# Patient Record
Sex: MALE | URBAN - METROPOLITAN AREA
[De-identification: names, ages, dates, MRNs, and addresses within clinical notes are randomized per-mention and may not be internally consistent; named-entity substitution may affect disease eponyms.]

---

## 2020-01-31 ENCOUNTER — APPOINTMENT (OUTPATIENT)
Age: 65
Setting detail: DERMATOLOGY
End: 2020-01-31

## 2020-01-31 VITALS — HEIGHT: 73 IN | RESPIRATION RATE: 16 BRPM | WEIGHT: 260 LBS

## 2020-01-31 DIAGNOSIS — L738 OTHER SPECIFIED DISEASES OF HAIR AND HAIR FOLLICLES: ICD-10-CM

## 2020-01-31 DIAGNOSIS — L663 OTHER SPECIFIED DISEASES OF HAIR AND HAIR FOLLICLES: ICD-10-CM

## 2020-01-31 PROBLEM — L02.02 FURUNCLE OF FACE: Status: ACTIVE | Noted: 2020-01-31

## 2020-01-31 PROBLEM — L02.92 FURUNCLE, UNSPECIFIED: Status: ACTIVE | Noted: 2020-01-31

## 2020-01-31 PROCEDURE — OTHER PRESCRIPTION: OTHER

## 2020-01-31 PROCEDURE — OTHER COUNSELING: OTHER

## 2020-01-31 PROCEDURE — 99202 OFFICE O/P NEW SF 15 MIN: CPT

## 2020-01-31 RX ORDER — CLINDAMYCIN PHOSPHATE 10 MG/ML
1% SOLUTION TOPICAL BID
Qty: 1 | Refills: 6 | Status: ERX

## 2020-01-31 ASSESSMENT — LOCATION ZONE DERM
LOCATION ZONE: FACE
LOCATION ZONE: SCALP

## 2020-01-31 ASSESSMENT — LOCATION DETAILED DESCRIPTION DERM
LOCATION DETAILED: HAIR
LOCATION DETAILED: LEFT SUPERIOR LATERAL BUCCAL CHEEK

## 2020-01-31 ASSESSMENT — LOCATION SIMPLE DESCRIPTION DERM
LOCATION SIMPLE: HAIR
LOCATION SIMPLE: LEFT CHEEK

## 2020-01-31 NOTE — HPI: RASH
How Severe Is Your Rash?: moderate
Is This A New Presentation, Or A Follow-Up?: Rash
Additional History: Has tried mayonaise. Used Aveda all natural shampoo.   Has been getting scanning and oozing. Has not had any medications for several years.

## 2020-02-05 ENCOUNTER — RX ONLY (RX ONLY)
Age: 65
End: 2020-02-05

## 2020-02-05 RX ORDER — BETAMETHASONE DIPROPIONATE 0.5 MG/ML
0.05% LOTION TOPICAL BID
Qty: 1 | Refills: 2 | Status: ERX | COMMUNITY
Start: 2020-02-05

## 2020-02-27 ENCOUNTER — APPOINTMENT (OUTPATIENT)
Age: 65
Setting detail: DERMATOLOGY
End: 2020-02-29

## 2020-02-27 ENCOUNTER — TRANSFERRED RECORDS (OUTPATIENT)
Dept: HEALTH INFORMATION MANAGEMENT | Facility: CLINIC | Age: 65
End: 2020-02-27

## 2020-02-27 VITALS — RESPIRATION RATE: 15 BRPM | HEIGHT: 73 IN | WEIGHT: 260 LBS

## 2020-02-27 DIAGNOSIS — L29.8 OTHER PRURITUS: ICD-10-CM

## 2020-02-27 DIAGNOSIS — R21 RASH AND OTHER NONSPECIFIC SKIN ERUPTION: ICD-10-CM

## 2020-02-27 PROCEDURE — 96372 THER/PROPH/DIAG INJ SC/IM: CPT | Mod: 59

## 2020-02-27 PROCEDURE — 99213 OFFICE O/P EST LOW 20 MIN: CPT | Mod: 25

## 2020-02-27 PROCEDURE — OTHER BIOPSY BY PUNCH METHOD: OTHER

## 2020-02-27 PROCEDURE — OTHER INTRAMUSCULAR KENALOG: OTHER

## 2020-02-27 PROCEDURE — OTHER COUNSELING: OTHER

## 2020-02-27 PROCEDURE — 11104 PUNCH BX SKIN SINGLE LESION: CPT

## 2020-02-27 ASSESSMENT — LOCATION ZONE DERM
LOCATION ZONE: ARM
LOCATION ZONE: TRUNK

## 2020-02-27 ASSESSMENT — LOCATION SIMPLE DESCRIPTION DERM
LOCATION SIMPLE: LEFT UPPER ARM
LOCATION SIMPLE: ABDOMEN
LOCATION SIMPLE: RIGHT BUTTOCK
LOCATION SIMPLE: RIGHT UPPER ARM

## 2020-02-27 ASSESSMENT — LOCATION DETAILED DESCRIPTION DERM
LOCATION DETAILED: LEFT ANTERIOR DISTAL UPPER ARM
LOCATION DETAILED: RIGHT ANTERIOR DISTAL UPPER ARM
LOCATION DETAILED: EPIGASTRIC SKIN
LOCATION DETAILED: RIGHT ANTERIOR PROXIMAL UPPER ARM
LOCATION DETAILED: RIGHT BUTTOCK

## 2020-02-27 NOTE — PROCEDURE: COUNSELING
Patient Specific Counseling (Will Not Stick From Patient To Patient): Recommend taking Cetiazine 10mg one po BID to TID.
Detail Level: Detailed

## 2020-02-27 NOTE — PROCEDURE: BIOPSY BY PUNCH METHOD
Render Path Notes In Note?: No
Detail Level: Detailed
Anesthesia Volume In Cc (Will Not Render If 0): 0.5
Punch Size In Mm: 3
Billing Type: Third-Party Bill
Size Of Lesion In Cm (Optional): 0
Wound Care: Petrolatum
Post-Care Instructions: I reviewed with the patient in detail post-care instructions. Patient is to keep the biopsy site dry overnight, and then apply bacitracin twice daily until healed. Patient may apply hydrogen peroxide soaks to remove any crusting.
Home Suture Removal Text: Patient was provided a home suture removal kit and will remove their sutures at home.  If they have any questions or difficulties they will call the office.
Hemostasis: None
Dressing: bandage
Suture Removal: 14 days
Was A Bandage Applied: Yes
Biopsy Type: H and E
Anesthesia Type: 1% lidocaine with epinephrine
Epidermal Sutures: 4-0 Ethilon
Consent: Written consent was obtained and risks were reviewed including but not limited to scarring, infection, bleeding, scabbing, incomplete removal, nerve damage and allergy to anesthesia.
Notification Instructions: Patient will be notified of biopsy results. However, patient instructed to call the office if not contacted within 2 weeks.

## 2020-02-27 NOTE — HPI: RASH
What Type Of Note Output Would You Prefer (Optional)?: Bullet Format
How Severe Is Your Rash?: moderate
Is This A New Presentation, Or A Follow-Up?: Rash
Additional History: Went to ER Feb 12. , steroid injection  , no new medications \\nTried to stop medications 3 days at a time\\n

## 2020-02-28 ENCOUNTER — RX ONLY (RX ONLY)
Age: 65
End: 2020-02-28

## 2020-04-01 ENCOUNTER — TELEPHONE (OUTPATIENT)
Dept: DERMATOLOGY | Facility: CLINIC | Age: 65
End: 2020-04-01

## 2020-04-01 NOTE — TELEPHONE ENCOUNTER
[facility] Call Center    Phone Message    May a detailed message be left on voicemail: yes     Reason for Call: Other: Pt was referred by PCP With [facility] Seems to have hives with Covid-19 GL's send Clinic encounter Please call Pt to discuss said from waist up to chin with hives Last seen with Dr Jose Girno might need allergy with [facility] not derm  Thank You,    Action Taken: Message routed to:  [facility] derm    Travel Screening: Not Applicable                                                                       (0) independent

## 2020-04-02 NOTE — TELEPHONE ENCOUNTER
LVM informing patient that we can schedule him for a video visit. Provided him with clinic number to call to schedule and discuss appointment details.

## 2020-04-02 NOTE — TELEPHONE ENCOUNTER
"Tele Nurse Call for NEW Patients (not seen in last 3 years):     The patient was contacted by phone and we reviewed, \"Due to the coronavirus pandemic, we are calling to reschedule your upcoming visit and offer you a tele visit. This would be assessed by an MD or CHARISMA;  Importantly, \"a tele visit may not be as thorough as an in-person visit, and the quality of the photograph and/or video sent may not be of the same quality as that taken by the clinic.\" After discussing the risks, benefits, and alternatives, the patient would like to proceed with teledermatology because of National Emergency Regarding Coronavirus disease (COVID 19) Outbreak.\"    This video visit will be conducted via a call between you and your physician/provider via [employer]. We have found that certain health care needs can be provided without the need for an in-person physical exam.  This service lets us provide the care you need with a video conversation.  If a prescription is necessary we can send it directly to your pharmacy.  If lab work is needed we can place an order for that and you can then stop by our lab to have the test done at a later time.If during the course of the call the physician/provider feels a video visit is not appropriate, you will not be charged for this service.    Patient would like the video invitation sent by: Text to cell phone: [phone]     The patient will also send photographs via [employer] for review.       The patient verified the location of the photo/video to be their home address.     The data of photos expected to be  4/6/2020 (Patient told must be within 3 business days of appointment)  The patient was instructed to take photos of all areas of concern.    Patient summary of issue: Diagnosed with bacterial infection of hair follicle in December- started on abx which did not help. Several days later bottom lip swelled and he was sent to the ED. (has hx of anaphylaxis). Received steroid shot- swelling/itching " improved. Several days later it returned.     Another biopsy taken of shoulder- resulted as hives. Was seen by allergy specialist who referred patient to Allergy Clinic at [facility].     Location of problem on body: Waistline to base of neck      How long has area/symptoms been present: December 2019    What makes it better?: Steroids, benadryl, ointment    What makes it worse?: Sweating     Which mediations have been tried, for how long, and did they make it better or worse (ex. Triamcinolone, used twice daily for 2 weeks, not improved): Steroids, benadryl, ointments      Other symptoms include the following: No other symptoms right now, but did previously have shortness of breath

## 2020-04-02 NOTE — TELEPHONE ENCOUNTER
[facility] Call Center    Phone Message    May a detailed message be left on voicemail: yes     Reason for Call: Other: Pt returned call, please call back to set up video visit.      Action Taken: Other: Allergy    Travel Screening: Not Applicable

## 2020-04-02 NOTE — TELEPHONE ENCOUNTER
FUTURE VISIT INFORMATION      FUTURE VISIT INFORMATION:    Date: 4/7/2020    Time: 11:30 AM    Location: [facility]  REFERRAL INFORMATION:    Referring provider:  ELOISA Vargas    Referring providers clinic:  [facility]    Reason for visit/diagnosis: Hives    RECORDS REQUESTED FROM:       Clinic name Comments Records Status Imaging Status   [facility] 4/1/20 - Virtual Visit with Dr. Maza  4/1/20 - Telephone referral and notes with ELOISA Vargas  3/30/20 - KENALOG inj with ELOISA Vargas  3/12/20 & 3/19/20 - ALLERGY OV with ELOISA Vargas Care Everywhere    [facility] [place]  Phone: [phone]  Fax: [phone] 2/27/20 - DERM OV with Dr. Milady Aguirre  1/31/20 - DERM OV with Dr. Javier Brambila 4/3 Sent to Scan                              * 4/2/20 12:16 PM [facility] - [place]

## 2020-04-07 ENCOUNTER — VIRTUAL VISIT (OUTPATIENT)
Dept: ALLERGY | Facility: CLINIC | Age: 65
End: 2020-04-07
Payer: COMMERCIAL

## 2020-04-07 ENCOUNTER — PRE VISIT (OUTPATIENT)
Dept: ALLERGY | Facility: CLINIC | Age: 65
End: 2020-04-07

## 2020-04-07 DIAGNOSIS — Z91.030 BEE STING ALLERGY: ICD-10-CM

## 2020-04-07 DIAGNOSIS — H10.10 ALLERGIC RHINOCONJUNCTIVITIS: ICD-10-CM

## 2020-04-07 DIAGNOSIS — L30.9 DERMATITIS, UNSPECIFIED: ICD-10-CM

## 2020-04-07 DIAGNOSIS — E13.69 OTHER SPECIFIED DIABETES MELLITUS WITH OTHER SPECIFIED COMPLICATION, UNSPECIFIED WHETHER LONG TERM INSULIN USE (H): ICD-10-CM

## 2020-04-07 DIAGNOSIS — J30.9 ALLERGIC RHINOCONJUNCTIVITIS: ICD-10-CM

## 2020-04-07 DIAGNOSIS — Z91.018 FOOD ALLERGY: ICD-10-CM

## 2020-04-07 DIAGNOSIS — L50.9 URTICARIA: ICD-10-CM

## 2020-04-07 DIAGNOSIS — T78.3XXD ANGIOEDEMA, SUBSEQUENT ENCOUNTER: ICD-10-CM

## 2020-04-07 DIAGNOSIS — L29.9 PRURITIC DISORDER: Primary | ICD-10-CM

## 2020-04-07 RX ORDER — DOXEPIN HYDROCHLORIDE 25 MG/1
25 CAPSULE ORAL AT BEDTIME
Qty: 30 CAPSULE | Refills: 1 | Status: SHIPPED | OUTPATIENT
Start: 2020-04-07 | End: 2020-06-06

## 2020-04-07 RX ORDER — CETIRIZINE HYDROCHLORIDE 10 MG/1
40-50 TABLET ORAL
COMMUNITY
Start: 2020-03-12

## 2020-04-07 RX ORDER — MONTELUKAST SODIUM 10 MG/1
10 TABLET ORAL EVERY EVENING
COMMUNITY
Start: 2020-03-13

## 2020-04-07 RX ORDER — FEXOFENADINE HCL 180 MG/1
180 TABLET ORAL EVERY MORNING
Qty: 60 TABLET | Refills: 1 | Status: SHIPPED | OUTPATIENT
Start: 2020-04-07 | End: 2020-06-06

## 2020-04-07 RX ORDER — OXYCODONE HYDROCHLORIDE 10 MG/1
10 TABLET ORAL DAILY
COMMUNITY

## 2020-04-07 RX ORDER — DIPHENHYDRAMINE HCL 25 MG
25-50 TABLET ORAL
COMMUNITY
Start: 2020-02-25

## 2020-04-07 RX ORDER — PREDNISONE 50 MG/1
TABLET ORAL
Qty: 2 TABLET | Refills: 3 | Status: SHIPPED | OUTPATIENT
Start: 2020-04-07 | End: 2021-01-15

## 2020-04-07 RX ORDER — ERGOCALCIFEROL 1.25 MG/1
50000 CAPSULE, LIQUID FILLED ORAL
COMMUNITY
Start: 2020-03-17

## 2020-04-07 RX ORDER — METAXALONE 800 MG/1
800 TABLET ORAL
COMMUNITY
Start: 2019-12-05

## 2020-04-07 ASSESSMENT — PAIN SCALES - GENERAL: PAINLEVEL: NO PAIN (0)

## 2020-04-07 NOTE — NURSING NOTE
Allergy Rooming Note    Jaspreet Ridley's goals for this visit include:   Chief Complaint   Patient presents with     Allergy Consult     Jaspreet is here for an allergy consult relating to hives and itching that is worse with sweating. Started with his sleep mask in December and has spread to his whole body since.       Keesha Garces LPN

## 2020-04-07 NOTE — PATIENT INSTRUCTIONS
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     Alpha Gal antibodies positives    Maybe combination of red meat and NSAIDs (Ibuprofen can lead to the angioedemas)    Emergency set: Prednisone 100mg and 360mg Fexofenadine  --> avoid eating red meat and particularly taking NSAIDs at the same time    >> Patient had anaphylactic reaction with generalized Urticaria and breathing problems to bee and wasp      Patient counseling:  - Montelukast continue 10mg evening  - Doxepin 25mg every evening  - Fexofenadine 180mg every morning   --> stop cetirizine  --> if possible do not take Ibuprofen Tabl (can take instead Tylenol if necessary), but write down when taken      Blood tests:  Total IgE, IgE bee, IgE wasp, Tryptase, CBC, HbA1c      Emergency treatment for patients with severe immediate allergic reactions to drugs, food or insect bites:      The clinical appearance of severe immediate type allergic reactions can range from wheals and hives all over the body (urticaria), to swellings in the face (eyes, lips) to sometimes swellings in the tongue or airways with problems to swallow or breathe. These reactions can end up in cardiovascular reactions with collapse and shock.    1. Stay calm, avoid running around, and alert other people to help you    2. Immediately take your emergency medication.     For adults (over 16 years old): 2 tablet of antihistamines (e.g. cetirizine 10 mg or fexofenadine 180 mg) and 100 mg prednisone.     For children (less than 16 years old): 1 tablet of antihistamine (e.g. cetirizine 10 mg or fexofenadine 180 mg) and 50 mg prednisone    Swallow however you can, with or without water. This treatment is usually sufficient for treatment of uncomplicated hives and swellings.       Emergency set in  key chain box containing 2 tablets prednisone 50 mg and 2 tablets cetirizine 10 mg.    3. In case of acute swelling of tongue, trouble swallowing, blocking of the airways, breathing problems, and/or signs of imminent collapse of shock (sweating, weakness, dizziness, paleness), use the adrenalin auto-injector (Epipen   for adults and Epipen   donald for children). Make an injection into the outer thighs, if necessary through clothing.    4. Seek immediate emergency medical treatment after use.        Jonas Garza, [facility], [facility]; 03-

## 2020-04-07 NOTE — PROGRESS NOTES
"Jaspreet Ridley is a 65 year old male who is being evaluated via a billable video visit.      The patient has been notified of following:     \"This video visit will be conducted via a call between you and your physician/provider. We have found that certain health care needs can be provided without the need for an in-person physical exam.  This service lets us provide the care you need with a video conversation.  If a prescription is necessary we can send it directly to your pharmacy.  If lab work is needed we can place an order for that and you can then stop by our lab to have the test done at a later time.    If during the course of the call the physician/provider feels a video visit is not appropriate, you will not be charged for this service.\"     Patient has given verbal consent for Video visit? Yes    Patient would like the video invitation sent by: Text to cell phone: [phone]    Video-Visit Details    Type of service:  Video Visit    Originating Location (pt. Location): Home    Distant Location (provider location):  [facility]     Mode of Communication:  Video Conference via Yuriy Garces LPN  "

## 2020-04-07 NOTE — PROGRESS NOTES
[facility] Dermato-Allergy Record ([facility](National Emergency Concerning the CORONAVIRUS (COVID 19))  Invitation sent by: Text to cell phone: [phone])    Image quality and interpretability: had to change after about 15min to phone consult, because of poor quality of connection    Physician has received verbal consent for a Video/Photos Visit from the patient? Yes    In-person dermato-allergy visit recommendation: yes - for physician visit      Allergy Problem List:    Specialty Problems     None          CC:   Allergy Consult (Jaspreet is here for an allergy consult relating to hives and itching that is worse with sweating. Started with his sleep mask in December and has spread to his whole body since.  )      Encounter Date: Apr 7, 2020    History of Present Illness:  I have reviewed the teledermatology  information and the nursing intake corresponding to this issue. Jaspreet Ridley is a 65 year old male who presents as a teledermatology consult for the following information take directly from the nursing note (kept in this note for patient safety) and video call performed by myself:   Middle December 2019 first on head itching and then over the strap of the CEPAP sleep apnoe mask and then over neck and later over the shoulders. First week of February in went to Dermatology --> diagnosed Folliculitis and got antibiotic and topicals. Since then got urticarial lesions all over the upper body.   Patient travelled much for work and beginning of February went to [place].   Patient had a more severe reaction with lip swelling and breathing problems on 4th February woke up with Urticaria and around 10:30 patient was short of breath (not really angioedema).  Flew then the same evening from [place] to [place]. On the morning of the 5th of February bottom lip all puffy and swollen, not really short of breath. In ER with antihistamines itch went away and steroid injection.  After 6-7 days Urticaria came back and then went  [facility]. They took there a biopsy and there again steroid injection.     Usually the lesions appear after sweating and they can itch or burn. After 3-4hours the lesions are still there.      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     Current medications:   Tizanidine muscle relaxants since 7 years (Zanaflex) --> stopped 3-4 days and no changes  Oxycodone every day  Metaxolone muscle relaxants  Montelukast from Dr. Yi  Famotidine from Dr. Yi  Vitamine D  Cetirizine 10mg 2xdaily  Diphenhydramine (Benadryl)    No blood pressure medications, no diabetes  Tries to avoid NSAIDs (sometimes Ibuprofen 800mg --> 1-2 times per month)      Allergy History:     Allergies   Allergen Reactions     Alpha-Gal Fatigue, Headache, Hives, Rash and Shortness Of Breath     Bee Pollen Anaphylaxis     Iodine Anaphylaxis     Contrast Dye      Fentanyl Hives     Iodine-131      --> as child pollen allergies. Still today in fall with ragweed rhinoconjunctivitis  --> as a child Asthma and had Immunotherapy    Social History:  The patient works as a . Patient has the following hobbies or non-occupational exposure     reports that he has quit smoking. He does not have any smokeless tobacco history on file.      Family History:  No family history on file.    Medications:  Current Outpatient Medications   Medication Sig Dispense Refill      Misc. Devices (CERVICAL TRACTION) KIT Over the Door Cervical Traction.  May be obtained through [employer] or ordered from the internet.  Traction weight from 8 to 15 pounds.  Not to exceed more than 15 pounds of traction weight.  Begin 10 minutes of traction daily and increase to 15 minutes, two to three times every day. 1 kit 0           Additional comments and observations from review of the patient s chart including the following:    Patient described the lesions as very small lesions that sometimes disappear after few hours    Biopsy came out as Urticaria      ROS: Patient generally feeling well today   Physical Examination:  General: Well-appearing, appropriately-developed individual.  Skin: Focused examination of the within the teledermatology photograph(s) was performed.   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     Alpha Gal antibodies positives    Maybe combination of red meat and NSAIDs (Ibuprofen can lead to the angioedemas)    Emergency set: Prednisone 100mg and 360mg Fexofenadine  --> avoid eating red meat and particularly taking NSAIDs at the same time    >> Patient had anaphylactic reaction with generalized Urticaria and breathing problems to bee and wasp      Patient counseling:  - Montelukast continue 10mg evening  - Doxepin 25mg every evening  - Fexofenadine 180mg every morning   --> stop cetirizine  --> if possible do not take Ibuprofen Tabl (can take instead Tylenol if necessary), but write down when taken      Blood tests:  Total IgE, IgE bee, IgE wasp, Tryptase,  CBC, HbA1c    Follow-up: in about 2-3 weeks for follow up    Thank you for the opportunity be involved in the care of this patient.     Staff:  Keesha Garces LPN    _____________________________________________________________________________    Teledermatology information:  - Location of patient: Home  - Patient presented in referral from: Dr. Garcia  - Location of teledermatologist:  ([facility] (, [place], [place])  - Reason teledermatology is appropriate:  of National Emergency Regarding Coronavirus disease (COVID 19) Outbreak  - Method of transmission:  Store and Forward ((National Emergency Concerning the CORONAVIRUS (COVID 19)   - Date of images: today  - Service start time:10:41 am  - Service end time:11:47  - Date of report: 04/07/20      I spent a total of 60 minutes for telemedicine consult with Jaspreet Ridley during today s video meeting. Over 50% of this time was spent counseling the patient and/or coordinating care. Please see Assessment and Plan for details.

## 2020-05-13 ENCOUNTER — TELEPHONE (OUTPATIENT)
Dept: DERMATOLOGY | Facility: CLINIC | Age: 65
End: 2020-05-13

## 2020-05-13 NOTE — TELEPHONE ENCOUNTER
[facility] Call Center    Phone Message    May a detailed message be left on voicemail: yes     Reason for Call: Symptoms or Concerns     If patient has red-flag symptoms, warm transfer to triage line    Current symptom or concern: hives are back    Symptoms have been present for:  3 day(s)    Has patient previously been seen for this? Yes    By Greg    Date: 4/7/20    Are there any new or worsening symptoms? Yes: Pt states they are worse.  Please follow up.       Action Taken: Message routed to:  [facility] derm    Travel Screening: Not Applicable

## 2020-05-13 NOTE — TELEPHONE ENCOUNTER
Patient reports he developed hives on chin approximately one week ago. Several days later, hives spread to his neck and then to his chest. Now, he is unable to sleep due to itching. He notes that when he sweats, the itching worsens. Per patient, he has not had red meat recently.    He also states the prescription for the emergency set was not filled. Per chart review, the prescriptions were printed locally. Will fax to pharmacy so patient can take emergency set.     Will route to provider.

## 2020-05-13 NOTE — TELEPHONE ENCOUNTER
Informed patient of the following per Dr. Garza. Patient also scheduled for virtual visit on 5/19.   Increase Doxepin to 50mg or Allegra 180mg morning and late afternoon.

## 2020-05-19 ENCOUNTER — VIRTUAL VISIT (OUTPATIENT)
Dept: ALLERGY | Facility: CLINIC | Age: 65
End: 2020-05-19
Payer: COMMERCIAL

## 2020-05-19 DIAGNOSIS — L29.9 PRURITIC DISORDER: ICD-10-CM

## 2020-05-19 DIAGNOSIS — L50.9 URTICARIA: Primary | ICD-10-CM

## 2020-05-19 DIAGNOSIS — T78.3XXD ANGIOEDEMA, SUBSEQUENT ENCOUNTER: ICD-10-CM

## 2020-05-19 RX ORDER — DOXEPIN HYDROCHLORIDE 25 MG/1
25 CAPSULE ORAL AT BEDTIME
Qty: 60 CAPSULE | Refills: 0 | Status: SHIPPED | OUTPATIENT
Start: 2020-05-19 | End: 2020-07-18

## 2020-05-19 RX ORDER — FEXOFENADINE HCL 180 MG/1
180 TABLET ORAL EVERY MORNING
Qty: 60 TABLET | Refills: 0 | Status: SHIPPED | OUTPATIENT
Start: 2020-05-19 | End: 2020-07-18

## 2020-05-19 RX ORDER — MONTELUKAST SODIUM 10 MG/1
10 TABLET ORAL AT BEDTIME
Qty: 60 TABLET | Refills: 0 | Status: SHIPPED | OUTPATIENT
Start: 2020-05-19 | End: 2020-07-18

## 2020-05-19 NOTE — PROGRESS NOTES
"Jaspreet Ridley is a 65 year old male who is being evaluated via a billable video visit.      The patient has been notified of following:     \"This video visit will be conducted via a call between you and your physician/provider. We have found that certain health care needs can be provided without the need for an in-person physical exam.  This service lets us provide the care you need with a video conversation.  If a prescription is necessary we can send it directly to your pharmacy.  If lab work is needed we can place an order for that and you can then stop by our lab to have the test done at a later time.    Video visits are billed at different rates depending on your insurance coverage.  Please reach out to your insurance provider with any questions.    If during the course of the call the physician/provider feels a video visit is not appropriate, you will not be charged for this service.\"    Patient has given verbal consent for Video visit? Yes    How would you like to obtain your AVS? [place]    Patient would like the video invitation sent by: Send to e-mail at: [email or web address]    Will anyone else be joining your video visit? No        Video-Visit Details    Type of service:  Video Visit    Originating Location (pt. Location): Home    Distant Location (provider location):   [facility]     Platform used for Video Visit: Ayden Garces LPN        "

## 2020-05-19 NOTE — PATIENT INSTRUCTIONS
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     Alpha Gal antibodies positives    Maybe combination of red meat and NSAIDs (Ibuprofen can lead to the angioedemas)    Emergency set: Prednisone 100mg and 360mg Fexofenadine  --> avoid eating red meat and particularly taking NSAIDs at the same time    >> Patient had anaphylactic reaction with generalized Urticaria and breathing problems to bee and wasp      Patient counseling:  - Montelukast continue 10mg evening  - Doxepin 25mg every evening  - Fexofenadine 180mg every morning   --> avoid red meat  --> if possible do not take Ibuprofen Tabl (can take instead Tylenol if necessary), but write down when taken

## 2020-05-19 NOTE — NURSING NOTE
Allergy Rooming Note    Jaspreet Ridley's goals for this visit include:   Chief Complaint   Patient presents with     RECHECK     Jaspreet is here for a follow up relating to hives     Keesha Garces LPN

## 2020-05-19 NOTE — PROGRESS NOTES
Note for return visit for Dermato-Allergy       Encounter Date: May 19, 2020    History of Present Illness:  I have reviewed the teledermatology  information and the nursing intake corresponding to this issue. Jaspreet Ridley is a 65 year old male who presents as a teledermatology consult for the following information take directly from the prior notes and phone/video call performed by myself:   Monday morning after mothers day patient had a reaction (woke up). Went to sleep on Sunday evening (went to sleep around 11pm and woke up about 2h later with sweating and had chest and neck again burning and itching sensations and next morning some on the arm pits.   Had this Sunday 2 eggs for breakfast and bread for brunch and dinner around 7/8pm and ate Chicken Fajitas. No alcohol. No additional pain medications except daily Oxycodon for chronic neck pain.   --> patient did not eat red meat on this day  --> however, patient did not do the treatment with Doxepin or Singulair and took only Allegra in the morning (Compliance?).    Patient has almost every 2nd evening these sweatings and itching attacks and therefore not better (however, compliance not very good)    Additional comments and observations from review of the patient s chart including the following:    See above    ROS: Patient generally feeling well today   Physical Examination:  General: Well-appearing, appropriately-developed individual.  Skin: Focused examination of the skin during video consult  - in the moment no special skin lesions  - more recurrent allergic problem    Previous History of Present Illness:  04/07/2020 virtual consultation:  Middle December 2019 first on head itching and then over the strap of the CEPAP sleep apnoe mask and then over neck and later over the shoulders. First week of February in went to Dermatology --> diagnosed Folliculitis and got antibiotic and topicals. Since then got urticarial lesions all over the upper body.   Patient  travelled much for work and beginning of February went to [place].   Patient had a more severe reaction with lip swelling and breathing problems on 4th February woke up with Urticaria and around 10:30 patient was short of breath (not really angioedema).  Flew then the same evening from [place] to [place]. On the morning of the 5th of February bottom lip all puffy and swollen, not really short of breath. In ER with antihistamines itch went away and steroid injection.  After 6-7 days Urticaria came back and then went [facility] They took there a biopsy and there again steroid injection.     Usually the lesions appear after sweating and they can itch or burn. After 3-4hours the lesions are still there.      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     Current medications:   Tizanidine muscle relaxants since 7 years (Zanaflex) --> stopped 3-4 days and no changes  Oxycodone every day  Metaxolone muscle relaxants  Montelukast from Dr. Yi  Famotidine from Dr. Yi  Vitamine D  Cetirizine 10mg 2xdaily  Diphenhydramine (Benadryl)    No blood pressure medications, no diabetes  Tries to avoid NSAIDs (sometimes Ibuprofen 800mg --> 1-2 times per month)      Allergy History:     Allergies   Allergen Reactions     Alpha-Gal Fatigue, Headache, Hives, Rash and Shortness Of Breath     Bee Pollen Anaphylaxis     Iodine Anaphylaxis      Contrast Dye      Fentanyl Hives     Iodine-131      --> as child pollen allergies. Still today in fall with ragweed rhinoconjunctivitis  --> as a child Asthma and had Immunotherapy    Social History:  The patient works as a . Patient has the following hobbies or non-occupational exposure     reports that he has quit smoking. He does not have any smokeless tobacco history on file.      Family History:  No family history on file.    Medications:  Current Outpatient Medications   Medication Sig Dispense Refill     diphenhydrAMINE (BENADRYL) 25 MG tablet Take 25-50 mg by mouth       doxepin (SINEQUAN) 25 MG capsule Take 1 capsule (25 mg) by mouth At Bedtime 30 capsule 1     fexofenadine (ALLEGRA) 180 MG tablet Take 1 tablet (180 mg) by mouth every morning 60 tablet 1     metaxalone (SKELAXIN) 800 MG tablet 800 mg       Misc. Devices (CERVICAL TRACTION) KIT Over the Door Cervical Traction.  May be obtained through [employer] or ordered from the internet.  Traction weight from 8 to 15 pounds.  Not to exceed more than 15 pounds of traction weight.  Begin 10 minutes of traction daily and increase to 15 minutes, two to three times every day. 1 kit 0     montelukast (SINGULAIR) 10 MG tablet Take 10 mg by mouth every evening       oxyCODONE IR (ROXICODONE) 10 MG tablet Take 10 mg by mouth daily       predniSONE (DELTASONE) 50 MG tablet Emergency set: 2 tablet 3     tiZANidine (ZANAFLEX) 4 MG tablet Take 4 mg by mouth At Bedtime       vitamin D2 (ERGOCALCIFEROL) 49033 units (1250 mcg) capsule Take 50,000 Units by mouth       cetirizine (ZYRTEC) 10 MG tablet Take 40-50 mg by mouth           Allergy Tests:    Past Allergy Test: in the 60s = ragweed, dust mites, feathers    Future Allergy Test:   >> plan in future atopy screen prick test and maybe oral provocation test with red meat      Impression/Plan:    >> Pruritic, probably non-urticarial rash on face, trunk and extremities    Need pictures  of the rash    Need histology report from the Dermatology    >> 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     Alpha Gal antibodies positives    Maybe combination of red meat and NSAIDs (Ibuprofen can lead to the angioedemas)    Emergency set: Prednisone 100mg and 360mg Fexofenadine  --> avoid eating red meat and particularly taking NSAIDs at the same time    >> Patient had anaphylactic reaction with generalized Urticaria and breathing problems to bee and wasp      Patient counseling:  - Montelukast continue 10mg evening  - Doxepin 25mg every evening  - Fexofenadine 180mg every morning   --> avoid red meat  --> if possible do not take Ibuprofen Tabl (can take instead Tylenol if necessary), but write down when taken    (maybe some problems with compliance)      Blood tests to be tested (involved nurse):  Total IgE, IgE bee, IgE wasp, Tryptase, CBC, HbA1c    Follow-up: in about 3 weeks for follow up    Thank you for the opportunity be involved in the care of this patient.     Staff:  Keesha Garces LPN    _____________________________________________________________________________    Teledermatology information:  - Location of patient: Home  - Patient presented in referral from: self   - Location of teledermatologist:  ([facility] (, [place], [place])  - Reason teledermatology is appropriate:  of National Emergency Regarding Coronavirus disease (COVID 19) Outbreak  - Method of transmission:  Store and Forward and Video ( Invitation sent by:  Jamin and send to e-mail at: [email or web address] )  - Date of images: during video  - Service start time: 10:40am  - Service end time:11:12 am  - Date of report: 05/19/20      I spent a total of 32 minutes for telemedicine consult with Jaspreet Ridley during today s video meeting. Over 50% of this time was spent counseling the patient and/or coordinating care. Please see Assessment and Plan for details.

## 2020-06-26 ENCOUNTER — TELEPHONE (OUTPATIENT)
Dept: DERMATOLOGY | Facility: CLINIC | Age: 65
End: 2020-06-26

## 2020-06-26 NOTE — TELEPHONE ENCOUNTER
[facility] Call Center    Phone Message    May a detailed message be left on voicemail: yes     Reason for Call: Symptoms or Concerns     If patient has red-flag symptoms, warm transfer to triage line    Current symptom or concern: Pt stated he has an outbreak in the exact same spot as the one previous. Pt stated he has been treated 3x for this flare.     Symptoms have been present for:  2 day(s)    Has patient previously been seen for this? Yes    By : Greg    Date: 5/19/2020    Are there any new or worsening symptoms? Yes: Pt stated this flare has happened over the exact same spot as last time and that is not normal. Pt wouldlike an in clinic visit to be seen. Please advise      Action Taken: Message routed to:  [facility] ([facility]): Derm    Travel Screening: Not Applicable

## 2020-06-29 NOTE — TELEPHONE ENCOUNTER
Contacted patient and he reports his symptoms have resolved. Scheduled patient for follow up visit with Dr. Garza to discuss treatment plan moving forward. Also faxed lab orders to [facility].

## 2020-07-10 ENCOUNTER — OFFICE VISIT (OUTPATIENT)
Dept: ALLERGY | Facility: CLINIC | Age: 65
End: 2020-07-10
Payer: COMMERCIAL

## 2020-07-10 DIAGNOSIS — J30.9 ALLERGIC RHINOCONJUNCTIVITIS: Primary | ICD-10-CM

## 2020-07-10 DIAGNOSIS — L20.89 OTHER ATOPIC DERMATITIS: ICD-10-CM

## 2020-07-10 DIAGNOSIS — H10.10 ALLERGIC RHINOCONJUNCTIVITIS: Primary | ICD-10-CM

## 2020-07-10 DIAGNOSIS — L23.89 ALLERGIC CONTACT DERMATITIS DUE TO OTHER AGENTS: ICD-10-CM

## 2020-07-10 RX ORDER — EMOLLIENT BASE
CREAM (GRAM) TOPICAL 2 TIMES DAILY
Qty: 453 G | Refills: 4 | Status: SHIPPED | OUTPATIENT
Start: 2020-07-10 | End: 2020-09-08

## 2020-07-10 RX ORDER — MOMETASONE FUROATE 1 MG/G
OINTMENT TOPICAL DAILY
Qty: 45 G | Refills: 1 | Status: SHIPPED | OUTPATIENT
Start: 2020-07-10 | End: 2020-07-18

## 2020-07-10 NOTE — PROGRESS NOTES
Note for return visit for Dermato-Allergy       Encounter Date: Jul 10, 2020    History of Present Illness:  Jaspreet Ridley is a 65 year old male who presents in person to the consult following information has been take directly from the prior notes, patients informations, Epic and/or other sources and exam/history performed by myself:     Had a flare up on 06/26 a flare up of a dermatitis on the right upper and lower arm and the lesions seem to appear always on same location and seem to stay longer than 24h and skin stays dry (now today still slight erythema and dryness)        Earlier History and Allergy exams:  I have reviewed the teledermatology  information and the nursing intake corresponding to this issue. Jaspreet Ridley is a 65 year old male who presents as a teledermatology consult for the following information take directly from the prior notes and phone/video call performed by myself:   Monday morning after mothers day patient had a reaction (woke up). Went to sleep on Sunday evening (went to sleep around 11pm and woke up about 2h later with sweating and had chest and neck again burning and itching sensations and next morning some on the arm pits.   Had this Sunday 2 eggs for breakfast and bread for brunch and dinner around 7/8pm and ate Chicken Fajitas. No alcohol. No additional pain medications except daily Oxycodon for chronic neck pain.   --> patient did not eat red meat on this day  --> however, patient did not do the treatment with Doxepin or Singulair and took only Allegra in the morning (Compliance?).    Patient has almost every 2nd evening these sweatings and itching attacks and therefore not better (however, compliance not very good)    Additional comments and observations from review of the patient s chart including the following:    See above    Additional comments and observations from review of the patient s chart including the following:    See details in notes    ROS: Patient generally  feeling well today  Physical Examination:  General: Well-appearing, appropriately-developed individual.  Skin: examination of the trunk, face and extremities within the consultation was performed.   - on the right upper arm still slight erythema and dry skin  - dry skin with slight erythema also on left arm and legs. Trunk basically without lesions and face with some erythema, but no eczema  - in photo from April 2nd shows eczematous lesions with scratch marks.    Previous History of Present Illness:  04/07/2020 virtual consultation:  Middle December 2019 first on head itching and then over the strap of the CEPAP sleep apnoe mask and then over neck and later over the shoulders. First week of February in went to Dermatology --> diagnosed Folliculitis and got antibiotic and topicals. Since then got urticarial lesions all over the upper body.   Patient travelled much for work and beginning of February went to [place].   Patient had a more severe reaction with lip swelling and breathing problems on 4th February woke up with Urticaria and around 10:30 patient was short of breath (not really angioedema).  Flew then the same evening from [place] to [place]. On the morning of the 5th of February bottom lip all puffy and swollen, not really short of breath. In ER with antihistamines itch went away and steroid injection.  After 6-7 days Urticaria came back and then went [facility] They took there a biopsy and there again steroid injection.     Usually the lesions appear after sweating and they can itch or burn. After 3-4hours the lesions are still there.      Patient had 2 episodes of anaphylactic reactions:  - first time as teenager stung by bees and got then hives and breathing problems   - second time in the late 80s, had cluster migraine and got contrast migraine injected for CT scan (contrast, Iodine 131) with hives and fainting  - about 10 years ago during use of illegal drugs patient had breathing problems  and had to go to ER (related to illegal drugs; Cocaine and did not use since)    Patient had positive alpha-Gal IgE (3.46 international unit(s)/)      Past Medical History:   Patient Active Problem List   Diagnosis     Cervicalgia     No past medical history on file.     Current medications:   Tizanidine muscle relaxants since 7 years (Zanaflex) --> stopped 3-4 days and no changes  Oxycodone every day  Metaxolone muscle relaxants  Montelukast from Dr. Yi  Famotidine from Dr. Yi  Vitamine D  Cetirizine 10mg 2xdaily  Diphenhydramine (Benadryl)    No blood pressure medications, no diabetes  Tries to avoid NSAIDs (sometimes Ibuprofen 800mg --> 1-2 times per month)      Allergy History:     Allergies   Allergen Reactions     Alpha-Gal Fatigue, Headache, Hives, Rash and Shortness Of Breath     Bee Pollen Anaphylaxis     Iodine Anaphylaxis     Contrast Dye      Fentanyl Hives     Iodine-131      --> as child pollen allergies. Still today in fall with ragweed rhinoconjunctivitis  --> as a child Asthma and had Immunotherapy    Social History:  The patient works as a . Patient has the following hobbies or non-occupational exposure     reports that he has quit smoking. He does not have any smokeless tobacco history on file.      Family History:  No family history on file.    Medications:  Current Outpatient Medications   Medication Sig Dispense Refill     cetirizine (ZYRTEC) 10 MG tablet Take 40-50 mg by mouth       diphenhydrAMINE (BENADRYL) 25 MG tablet Take 25-50 mg by mouth       doxepin (SINEQUAN) 25 MG capsule Take 1 capsule (25 mg) by mouth At Bedtime 60 capsule 0     doxepin (SINEQUAN) 25 MG capsule Take 1 capsule (25 mg) by mouth At Bedtime 30 capsule 1     fexofenadine (ALLEGRA) 180 MG tablet Take 1 tablet (180 mg) by mouth every morning 60 tablet 0     metaxalone (SKELAXIN) 800 MG tablet 800 mg       Misc. Devices (CERVICAL TRACTION) KIT Over the Door Cervical Traction.  May be obtained through  [facility] or ordered from the internet.  Traction weight from 8 to 15 pounds.  Not to exceed more than 15 pounds of traction weight.  Begin 10 minutes of traction daily and increase to 15 minutes, two to three times every day. 1 kit 0     montelukast (SINGULAIR) 10 MG tablet Take 1 tablet (10 mg) by mouth At Bedtime 60 tablet 0     montelukast (SINGULAIR) 10 MG tablet Take 10 mg by mouth every evening       oxyCODONE IR (ROXICODONE) 10 MG tablet Take 10 mg by mouth daily       predniSONE (DELTASONE) 50 MG tablet Emergency set: 2 tablet 3     tiZANidine (ZANAFLEX) 4 MG tablet Take 4 mg by mouth At Bedtime       vitamin D2 (ERGOCALCIFEROL) 86545 units (1250 mcg) capsule Take 50,000 Units by mouth         Allergy Tests:    Past Allergy Test: in the 60s = ragweed, dust mites, feathers    Future Allergy Test:   >> plan in future atopy screen prick test and maybe oral provocation test with red meat    Order for PATCH TESTS    [x] Outpatient  [] Inpatient: [facility]..../ Bed ....      Skin Atopy (atopic dermatitis) [x] Yes   [] No since Dec 2019  Rhinitis/Sinusitis:   [x] Yes   [] No as child with positive skin tests and IT as child  Allergic Asthma:   [x] Yes   [] No ? As child  Food Allergy:   [x] Yes   [] No  Leg ulcers:   [] Yes   [] No  Hand eczema:   [] Yes   [] No   Leading hand:   [] R   [] L       [] Ambidextrous                        Reason for tests (suspected allergy): recurrent dermatitis on extremities DDx atopic dermatitis and/or allergic contact dermatitis  Known previous allergies: none    Standardized panels  [x] Standard panel (40 tests)  [x] Preservatives & Antimicrobials (31 tests)  [x] Emulsifiers & Additives (25 tests)   [x] Perfumes/Flavours & Plants (25 tests) wife uses essential oil diffuser  [] Hairdresser panel (12 tests)  [] Rubber Chemicals (22 tests)  [] Plastics (26 tests)  [] Colorants/Dyes/Food additives (20 tests)  [] Metals (implants/dental) (24 tests)  [] Local  anaesthetics/NSAIDs (14 tests)  [] Antibiotics & Antimycotics (14 tests)   [x] Corticosteroids (15 tests)   [] Photopatch test (62 tests)   [] others: ...      [x] Patient's own products: Benadryl cream (make it worse), Calamine lotion   and whichhazel    DO NOT test if chemical or biological identity is unknown!     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     Alpha Gal antibodies positives    Maybe combination of red meat and NSAIDs (Ibuprofen can lead to the angioedemas)    Emergency set: Prednisone 100mg and 360mg Fexofenadine  --> avoid eating red meat and particularly taking NSAIDs at the same time    >> Patient had anaphylactic reaction with generalized Urticaria and breathing problems to bee and wasp      Patient counseling:  - Montelukast continue 10mg evening  - Doxepin 25mg every evening  - Fexofenadine 180mg every morning   --> avoid red meat  --> if possible do not take Ibuprofen Tabl (can take instead Tylenol if necessary), but write down when taken    >> take care of the atopic skin:    Vanicream cream 2x daily entire body    Free&Clear Shampoo and soap    Mometasone ointment on the       Blood tests to be tested (involved nurse):  Total IgE, IgE bee, IgE wasp, Tryptase, CBC, HbA1c    Follow-up: in about 3 weeks for patch and prick tests    Thank you for the opportunity be involved in the care of this patient.      Staff:  Keesha Garces LPN      I spent a total of 36 minutes face to face with Jaspreet Ridley during today s office visit. Over 50% of this time was spent counseling the patient and/or coordinating care. Please see Assessment and Plan for details.

## 2020-07-10 NOTE — PATIENT INSTRUCTIONS
Allergy Tests:    Past Allergy Test: in the 60s = ragweed, dust mites, feathers    Future Allergy Test:   >> plan in future atopy screen prick test and maybe oral provocation test with red meat    Order for PATCH TESTS    [x] Outpatient  [] Inpatient: [facility]..../ Bed ....      Skin Atopy (atopic dermatitis) [x] Yes   [] No since Dec 2019  Rhinitis/Sinusitis:   [x] Yes   [] No as child with positive skin tests and IT as child  Allergic Asthma:   [x] Yes   [] No ? As child  Food Allergy:   [x] Yes   [] No  Leg ulcers:   [] Yes   [] No  Hand eczema:   [] Yes   [] No   Leading hand:   [] R   [] L       [] Ambidextrous                        Reason for tests (suspected allergy): recurrent dermatitis on extremities DDx atopic dermatitis and/or allergic contact dermatitis  Known previous allergies: none    Standardized panels  [x] Standard panel (40 tests)  [x] Preservatives & Antimicrobials (31 tests)  [x] Emulsifiers & Additives (25 tests)   [x] Perfumes/Flavours & Plants (25 tests) wife uses essential oil diffuser  [] Hairdresser panel (12 tests)  [] Rubber Chemicals (22 tests)  [] Plastics (26 tests)  [] Colorants/Dyes/Food additives (20 tests)  [] Metals (implants/dental) (24 tests)  [] Local anaesthetics/NSAIDs (14 tests)  [] Antibiotics & Antimycotics (14 tests)   [x] Corticosteroids (15 tests)   [] Photopatch test (62 tests)   [] others: ...      [x] Patient's own products: Benadryl cream (make it worse), Calamine lotion   and whichhazel    DO NOT test if chemical or biological identity is unknown!     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     Alpha Gal antibodies positives    Maybe combination of red meat and NSAIDs (Ibuprofen can lead to the angioedemas)    Emergency set: Prednisone 100mg and 360mg Fexofenadine  --> avoid eating red meat and particularly taking NSAIDs at the same time    >> Patient had anaphylactic reaction with generalized Urticaria and breathing problems to bee and wasp      Patient counseling:  - Montelukast continue 10mg evening  - Doxepin 25mg every evening  - Fexofenadine 180mg every morning   --> avoid red meat  --> if possible do not take Ibuprofen Tabl (can take instead Tylenol if necessary), but write down when taken    >> take care of the atopic skin:    Vanicream cream 2x daily entire body    Free&Clear Shampoo and soap    Mometasone ointment on the       Blood tests to be tested (involved nurse):  Total IgE, IgE bee, IgE wasp, Tryptase, CBC, HbA1c    Follow-up: in about 3 weeks for patch and prick tests

## 2020-07-10 NOTE — LETTER
7/10/2020         RE: Jaspreet Ridley  [address] [place] [address]        Dear Colleague,    Thank you for referring your patient, Jaspreet Ridley, to the [facility] ALLER[facility]. Please see a copy of my visit note below.    Note for return visit for Dermato-Allergy       Encounter Date: Jul 10, 2020    History of Present Illness:  Jaspreet Ridley is a 65 year old male who presents in person to the consult following information has been take directly from the prior notes, patients informations, Epic and/or other sources and exam/history performed by myself:     Had a flare up on 06/26 a flare up of a dermatitis on the right upper and lower arm and the lesions seem to appear always on same location and seem to stay longer than 24h and skin stays dry (now today still slight erythema and dryness)        Earlier History and Allergy exams:  I have reviewed the teledermatology  information and the nursing intake corresponding to this issue. Jaspreet Ridley is a 65 year old male who presents as a teledermatology consult for the following information take directly from the prior notes and phone/video call performed by myself:   Monday morning after mothers day patient had a reaction (woke up). Went to sleep on Sunday evening (went to sleep around 11pm and woke up about 2h later with sweating and had chest and neck again burning and itching sensations and next morning some on the arm pits.   Had this Sunday 2 eggs for breakfast and bread for brunch and dinner around 7/8pm and ate Chicken Fajitas. No alcohol. No additional pain medications except daily Oxycodon for chronic neck pain.   --> patient did not eat red meat on this day  --> however, patient did not do the treatment with Doxepin or Singulair and took only Allegra in the morning (Compliance?).    Patient has almost every 2nd evening these sweatings and itching attacks and therefore not better (however, compliance not very good)    Additional comments and  observations from review of the patient s chart including the following:    See above    Additional comments and observations from review of the patient s chart including the following:    See details in notes    ROS: Patient generally feeling well today  Physical Examination:  General: Well-appearing, appropriately-developed individual.  Skin: examination of the trunk, face and extremities within the consultation was performed.   - on the right upper arm still slight erythema and dry skin  - dry skin with slight erythema also on left arm and legs. Trunk basically without lesions and face with some erythema, but no eczema  - in photo from April 2nd shows eczematous lesions with scratch marks.    Previous History of Present Illness:  04/07/2020 virtual consultation:  Middle December 2019 first on head itching and then over the strap of the CEPAP sleep apnoe mask and then over neck and later over the shoulders. First week of February in went to Dermatology --> diagnosed Folliculitis and got antibiotic and topicals. Since then got urticarial lesions all over the upper body.   Patient travelled much for work and beginning of February went to [place].   Patient had a more severe reaction with lip swelling and breathing problems on 4th February woke up with Urticaria and around 10:30 patient was short of breath (not really angioedema).  Flew then the same evening from [place] to [place]. On the morning of the 5th of February bottom lip all puffy and swollen, not really short of breath. In ER with antihistamines itch went away and steroid injection.  After 6-7 days Urticaria came back and then went back to [facility]. They took there a biopsy and there again steroid injection.     Usually the lesions appear after sweating and they can itch or burn. After 3-4hours the lesions are still there.      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     Current medications:   Tizanidine muscle relaxants since 7 years (Zanaflex) --> stopped 3-4 days and no changes  Oxycodone every day  Metaxolone muscle relaxants  Montelukast from Dr. Yi  Famotidine from Dr. Yi  Vitamine D  Cetirizine 10mg 2xdaily  Diphenhydramine (Benadryl)    No blood pressure medications, no diabetes  Tries to avoid NSAIDs (sometimes Ibuprofen 800mg --> 1-2 times per month)      Allergy History:     Allergies   Allergen Reactions     Alpha-Gal Fatigue, Headache, Hives, Rash and Shortness Of Breath     Bee Pollen Anaphylaxis     Iodine Anaphylaxis     Contrast Dye      Fentanyl Hives     Iodine-131      --> as child pollen allergies. Still today in fall with ragweed rhinoconjunctivitis  --> as a child Asthma and had Immunotherapy    Social History:  The patient works as a . Patient has the following hobbies or non-occupational exposure     reports that he has quit smoking. He does not have any smokeless tobacco history on file.      Family History:  No family history on file.    Medications:  Current Outpatient Medications   Medication Sig Dispense Refill     cetirizine (ZYRTEC) 10 MG tablet Take 40-50 mg by mouth       diphenhydrAMINE (BENADRYL) 25 MG tablet Take 25-50 mg by mouth       doxepin (SINEQUAN) 25 MG capsule Take 1 capsule (25 mg) by mouth At Bedtime 60 capsule 0     doxepin (SINEQUAN) 25 MG capsule Take 1 capsule (25 mg) by mouth At Bedtime 30 capsule 1      fexofenadine (ALLEGRA) 180 MG tablet Take 1 tablet (180 mg) by mouth every morning 60 tablet 0     metaxalone (SKELAXIN) 800 MG tablet 800 mg       Misc. Devices (CERVICAL TRACTION) KIT Over the Door Cervical Traction.  May be obtained through [employer] or ordered from the internet.  Traction weight from 8 to 15 pounds.  Not to exceed more than 15 pounds of traction weight.  Begin 10 minutes of traction daily and increase to 15 minutes, two to three times every day. 1 kit 0     montelukast (SINGULAIR) 10 MG tablet Take 1 tablet (10 mg) by mouth At Bedtime 60 tablet 0     montelukast (SINGULAIR) 10 MG tablet Take 10 mg by mouth every evening       oxyCODONE IR (ROXICODONE) 10 MG tablet Take 10 mg by mouth daily       predniSONE (DELTASONE) 50 MG tablet Emergency set: 2 tablet 3     tiZANidine (ZANAFLEX) 4 MG tablet Take 4 mg by mouth At Bedtime       vitamin D2 (ERGOCALCIFEROL) 98881 units (1250 mcg) capsule Take 50,000 Units by mouth         Allergy Tests:    Past Allergy Test: in the 60s = ragweed, dust mites, feathers    Future Allergy Test:   >> plan in future atopy screen prick test and maybe oral provocation test with red meat    Order for PATCH TESTS    [x] Outpatient  [] Inpatient: [facility]..../ Bed ....      Skin Atopy (atopic dermatitis) [x] Yes   [] No since Dec 2019  Rhinitis/Sinusitis:   [x] Yes   [] No as child with positive skin tests and IT as child  Allergic Asthma:   [x] Yes   [] No ? As child  Food Allergy:   [x] Yes   [] No  Leg ulcers:   [] Yes   [] No  Hand eczema:   [] Yes   [] No   Leading hand:   [] R   [] L       [] Ambidextrous                        Reason for tests (suspected allergy): recurrent dermatitis on extremities DDx atopic dermatitis and/or allergic contact dermatitis  Known previous allergies: none    Standardized panels  [x] Standard panel (40 tests)  [x] Preservatives & Antimicrobials (31 tests)  [x] Emulsifiers & Additives (25 tests)   [x] Perfumes/Flavours  & Plants (25 tests) wife uses essential oil diffuser  [] Hairdresser panel (12 tests)  [] Rubber Chemicals (22 tests)  [] Plastics (26 tests)  [] Colorants/Dyes/Food additives (20 tests)  [] Metals (implants/dental) (24 tests)  [] Local anaesthetics/NSAIDs (14 tests)  [] Antibiotics & Antimycotics (14 tests)   [x] Corticosteroids (15 tests)   [] Photopatch test (62 tests)   [] others: ...      [x] Patient's own products: Benadryl cream (make it worse), Calamine lotion   and whichhazel    DO NOT test if chemical or biological identity is unknown!     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     Alpha Gal antibodies positives    Maybe combination of red meat and NSAIDs (Ibuprofen can lead to the angioedemas)    Emergency set: Prednisone 100mg and 360mg Fexofenadine  --> avoid eating red meat and particularly taking NSAIDs at the same time    >> Patient had anaphylactic reaction with generalized Urticaria and breathing problems to bee and wasp      Patient counseling:  - Montelukast continue 10mg evening  - Doxepin 25mg every evening  - Fexofenadine 180mg every morning   --> avoid red meat  --> if possible do not take Ibuprofen Tabl (can take instead Tylenol if necessary), but write down when taken    >> take care of the atopic skin:    Vanicream cream 2x daily entire body    Free&Clear Shampoo and soap    Mometasone ointment on the        Blood tests to be tested (involved nurse):  Total IgE, IgE bee, IgE wasp, Tryptase, CBC, HbA1c    Follow-up: in about 3 weeks for patch and prick tests    Thank you for the opportunity be involved in the care of this patient.     Staff:  Keesha Garces LPN      I spent a total of 36 minutes face to face with Jaspreet Ridley during today s office visit. Over 50% of this time was spent counseling the patient and/or coordinating care. Please see Assessment and Plan for details.     Again, thank you for allowing me to participate in the care of your patient.        Sincerely,        Jonas Garza MD

## 2020-07-10 NOTE — NURSING NOTE
Chief Complaint   Patient presents with     RECHECK     Patient following up on bloodwork. Patient reports he continues to have areas of irritation. He feels that this does not have to with meat allergy.

## 2020-08-07 ENCOUNTER — TELEPHONE (OUTPATIENT)
Dept: DERMATOLOGY | Facility: CLINIC | Age: 65
End: 2020-08-07

## 2020-08-07 NOTE — TELEPHONE ENCOUNTER
STANDARD Series         No Substance 2 days 4 days remarks   1 Neil Mix [C] - -    2 Colophony - -    3  2-Mercaptobenzothiazole  - -     4 Methylisothiazolinone - -    5 Carba Mix - -    6 Thiuram Mix [A] - -    7 Bisphenol A Epoxy Resin - -    8 J-Fsvl-Vkegwusacvp-Formaldehyde Resin - -    9 Mercapto Mix [A] - -    10 Black Rubber Mix- PPD [B] - -    11 Potassium Dichromate  -  -    12 Balsam of [place] (Myroxylon Pereirae Resin) - -    13 Nickel Sulphate Hexahydrate - -    14 Mixed Dialkyl Thiourea - -    15 Paraben Mix [B] - -    16 Methyldibromo Glutaronitrile - -    17 Fragrance Mix - -    18 2-Bromo-2-Nitropropane-1,3-Diol (Bronopol) - -    19 Lyral - -    20 Tixocortol-21- Pivalate - -    21 Diazolidiyl Urea (Germall II) - -    22 Methyl Methacrylate - -    23 Cobalt (II) Chloride Hexahydrate - -    24 Fragrance Mix II  - -    25 Compositae Mix - -    26 Benzoyl Peroxide - -    27 Bacitracin - -    28 Formaldehyde - -    29 Methylchloroisothiazolinone / Methylisothiazolinone - -    30 Corticosteroid Mix - -    31 Sodium Lauryl Sulfate - -    32 Lanolin Alcohol - -    33 Turpentine - -    34 Cetylstearylalcohol - -    35 Chlorhexidine Dicluconate - -    36 Budenoside - -    37 Imidazolidinyl Urea  - -    38 Ethyl-2 Cyanoacrylate - -    39 Quaternium 15 (Dowicil 200) - -    40 Decyl Glucoside - -      EMULSIFIERS & ADDITIVES        No Substance 2 days 4 days remarks   41 Polyethylene Glycol-400 - -    42 Cocamidopropyl Betaine - -    43 Amerchol L101 - -    44 Propylene Glycol - -    45 Triethanolamine - -    46 Sorbitane Sesquiolate - -    47 Isopropylmyristate - -    48 Polysorbate 80  - -    49 Amidoamine   (Stearamidopropyl Dimethylamine) - -    50 Oleamidopropyl Dimethylamine - -    51 Lauryl Glucoside - -    52 Coconut Diethanolamide  - -    53 2-Hydroxy-4-Methoxy Benzophenone (Oxybenzone) - -    54 Benzophenone-4 (Sulisobenzon) - -    55 Propolis - -    56 Dexpanthenol - -    57 Carboxymethyl Cellulose  Sodium - -    58 Abitol - -    59 Tert-Butylhydroquinone - -    60 Benzyl Salicylate - -     Antioxidant      61 Dodecyl Gallate - -    62 Butylhydroxyanisole (BHA) - -    63 Butylhydroxytoluene (BHT) - -    64 Di-Alpha-Tocopherol (Vit E) - -    65 Propyl Gallate - -      PERFUMES, FLAVORS & PLANTS         No Substance 2 days 4 days remarks   66 Benzyl Salicylate - -    67 Benzyl Cinnamate - -    68 Di-Limonene (Dipentene) - -    69 Cananga Odorata (Jacqueline Phillips) (I) - -    70 Lichen Acid Mix - -    71 Mentha Piperita Oil (Peppermint Oil) - -    72 Sesquiterpenelactone mix - -    73 Tea Tree Oil, Oxidized - -    74 Wood Tar Mix - -    75 Abietic Acid - -    76 Lavendula Angustifolia Oil (Lavender Oil) - -    77 Camphor  - -     Fragrance Mix I      78 Oakmoss Absolute - -    79 Eugenol - -    80 Geraniol - -    81 Hydroxycitronellal - -    82 Isoeugenol - -    83 Cinnamic Aldehyde - -    84 Cinnamic Alcohol  - -    85 Sorbitane Sesquioleate - -     Fragrance mix II      86 Citronellol - -    87 Alpha-Hexylcinnamic Aldehyde    - -    88 Citral - -    89 Farnesol - -    90 Coumarin - -      CORTICOSTEROIDS    No Substance 2 days 4 days remarks Allergy  class   91 Amcinonide - -  B   92 Betametasone-17,21 Dipropionate - -  D1   93 Desoximetasone - -  C   94 Betamethasone-17-Valerate - -  D1   95 Dexamethasone - -  C   96 Hydrocortisone - -  A   97 Clobetasol-17-Propionate - -  D1   98 Dexamethasone-21-Phosphate Disodium Salt - -  C   99 Hydrocortisone-17 Butyrate - -  D2   100 Prednisolone - -  A   101 Mometason Furoate - -  D1   102 Triamcinolone Acetonide - -  B   103 Methylprednisolone Aceponate - -  D2   104 Hydrocortisone-21-Acetate - -  A   105 Prednicarbate - -  D2       Group Characteristics of group Generic name Name  cross reactions   A Hydrocortisone   Cloprednole, Fludrocortisone acétate, Hydrocortison acetate, Methylprednisolone, Prednisolone, Tixocortolpivalate Alfacortone, Fucidin H, Dermacalm,  Hexacortone, Premandole, Imacort With group D2   B Triamcinolone-acetonide   Budenoside (R-isomer), Amcinonide, Desonide, Fluocinolone acetonide, Triamcinolone acetonide Locapred, Locatop  Synalar, Pevisone, Ken[place]ort -   C Betamethasone (Without Lashon)   Betamethasone, Dexamethasone, Flumethasone pivalate, Halomethasone Daivobet, Dexasalyl, Locasalen,   -   D1 Betamethasone-diproprionate   Betamethasone dipropionate, Betamethasone-17-valerate, Clobetasole-propionate, Fluticasone propionate, Mometasone furoate Betnovate, Diprogenta, Diprosalic, Diprosone, Celestoderm, Fucicort,  Cutivate, Axotide, Elocom -   D2 Methylprednisolone-aceponate   Hydrocortisone-aceponate, Hydrocortisone-buteprate, Hydrocortisone-17-butyrate, Methylprednisolone aceponate, Prednicarbate Locoïd, Advantan,  Prednitop With group A and Budesonide (S-isomer)       PRESERVATIVES & ANTIMICROBIALS         No Substance 2 days 4 days remarks   106  1,2-Benzisothiazoline-3-One, Sodium Salt - -    107  1,3,5-Ross (2-Hydroxyethyl) - Hexahydrotriazine (Grotan BK) - -    108 8-Oeftpnbujcfdy-9-Nitro-1, 3-Propanediol - -    109  3, 4, 4' - Triclocarban - -    110 4 - Chloro - 3 - Cresol - -    111 4 - Chloro - 4 - Xylenol (PCMX) - -    112 7-Ethylbicyclooxazolidine (Solarte Healthan [id number]) - -    113 Benzalkonium Chloride - -    114 Benzyl Alcohol - -    115 Cetalkonium Chloride - -    116 Cetylpyrimidine Chloride  - -    117 Chloroacetamide - -    118 DMDM Hydantoin - -    119 Glutaraldehyde - -    120 Triclosan - -    121 Glyoxal Trimeric Dihydrate - -    122 Iodopropynyl Butylcarbamate - -    123 Octylisothiazoline - -    124 Iodoform - -    125 (Nitrobutyl) Morpholine/(Ethylnitro-Trimethylene) Dimorpholine (Bioban P 1487) - -    126 Phenoxyethanol - -    127 Phenyl Salicylate - -    128 Povidone Iodine - -    129 Sodium Benzoate - -    130 Sodium Disulfite - -    131 Sorbic Acid - -    132 Thimerosal - -     Parabens      133 Butyl-P-Hydroxybenzoate - -    134  "Ethyl-P-Hydroxybenzoate - -    135 Methyl-P-Hydroxybenzoate - -    136 Propyl-P-Hydroxybenzoate - -      OTHER PRODUCTS         No Substance Conc  % solv 2 days 4 days remarks   137 Benadryl cream         138 Calamine lotion                  139 whichhaze        140           Patients own products:  è DO NOT test if chemical or biological identity is unknown!   - always ask from patient the product information and safety sheets and consult with the physician   - check neutral pH with pH indicator paper (do not test pH below 5 or over 8 or consult with physician)  - leave-on cosmetics can be tested \"as is\"  - rinse-off products have to be diluted with water, buffer, olive oil or paraffin (discuss with physician)  à remember:   - non-specific toxic/corrosive or biological reactions can occur  - tests with patients own products are not standardized and test conditions are not optimized for patch tests. Whenever possible test with standardized test series and correlate use of product with the result of the patch tests  - if not sure if compounds can be tested under occlusion in patch tests consider open application tests          Results of patch tests:                         Interpretation:    - Negative                    A    = Allergic      (+) Erythema    TI   = Toxic/irritant   + E + Infiltration    RaP = Relevance at Present     ++ E/I + Papulovesicle   Rpr  = Relevance Previously     +++ E/I/P + Blister     nR   = No Relevance      Atopy Screen    No Substance Readings (15 min) Evaluation   POS Histamine 1mg/ml -    NEG NaCl 0.9% -      No Substance Readings (15 min) Evaluation   1 Alternaria alternata (tenuis)  -    2 Cladosporium herbarum -    3 Aspergillus fumigatus -    4 Penicillium notatum -    5 Dermatophagoides pteronyssinus -    6 Dermatophagoides farinae -    7 Dog epithelium (canis spp) -    8 Cat hair (marco catus) -    9 Cockroach   (Blatella americana & germanica) -    10 Grass mix Foster "   (Kellee, Orchard, Redtop, Francisco Javier) -    11 Jj grass (sorghum halepense) -    12 Weed mix   (common Cocklebur, Lamb s quarters, rough redroot Pigweed, Dock/Sorrel) -    13 Mug wort (artemisia vulgare) -    14 Ragweed giant/short (ambrosia spp) -    15 English Plantain (plantago lanceolata) -    16 Tree mix 1 (Pecan, Maple BHR, Oak RVW, american Kaufman, black [place]) -    17 Red cedar (juniperus virginia) -    18 Tree mix 2   (white Frederic, river/red Birch, black [place], common Obion, american Elm) -    19 Box elder/Maple mix (acer spp) -    20 Beresford shagbark (carya ovata) -       -      Conclusion:
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
## 2020-09-14 ENCOUNTER — THERAPY VISIT (OUTPATIENT)
Dept: PHYSICAL THERAPY | Facility: CLINIC | Age: 65
End: 2020-09-14
Payer: COMMERCIAL

## 2020-09-14 DIAGNOSIS — M54.2 CERVICALGIA: Primary | ICD-10-CM

## 2020-09-14 PROCEDURE — 97112 NEUROMUSCULAR REEDUCATION: CPT | Mod: GP | Performed by: PHYSICAL THERAPIST

## 2020-09-14 PROCEDURE — 97161 PT EVAL LOW COMPLEX 20 MIN: CPT | Mod: GP | Performed by: PHYSICAL THERAPIST
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
## 2020-09-23 ENCOUNTER — THERAPY VISIT (OUTPATIENT)
Dept: PHYSICAL THERAPY | Facility: CLINIC | Age: 65
End: 2020-09-23
Payer: COMMERCIAL

## 2020-09-23 DIAGNOSIS — M54.2 CERVICALGIA: ICD-10-CM

## 2020-09-23 PROCEDURE — 97140 MANUAL THERAPY 1/> REGIONS: CPT | Mod: GP | Performed by: PHYSICAL THERAPIST

## 2020-09-23 PROCEDURE — 97112 NEUROMUSCULAR REEDUCATION: CPT | Mod: GP | Performed by: PHYSICAL THERAPIST

## 2020-09-30 ENCOUNTER — THERAPY VISIT (OUTPATIENT)
Dept: PHYSICAL THERAPY | Facility: CLINIC | Age: 65
End: 2020-09-30
Payer: COMMERCIAL

## 2020-09-30 DIAGNOSIS — M54.2 CERVICALGIA: ICD-10-CM

## 2020-09-30 PROCEDURE — 97112 NEUROMUSCULAR REEDUCATION: CPT | Mod: GP | Performed by: PHYSICAL THERAPIST

## 2020-09-30 PROCEDURE — 97140 MANUAL THERAPY 1/> REGIONS: CPT | Mod: GP | Performed by: PHYSICAL THERAPIST

## 2020-10-07 ENCOUNTER — THERAPY VISIT (OUTPATIENT)
Dept: PHYSICAL THERAPY | Facility: CLINIC | Age: 65
End: 2020-10-07
Payer: COMMERCIAL

## 2020-10-07 DIAGNOSIS — M54.2 CERVICALGIA: Primary | ICD-10-CM

## 2020-10-07 PROCEDURE — 97112 NEUROMUSCULAR REEDUCATION: CPT | Mod: GP | Performed by: PHYSICAL THERAPIST

## 2020-10-07 PROCEDURE — 97140 MANUAL THERAPY 1/> REGIONS: CPT | Mod: GP | Performed by: PHYSICAL THERAPIST

## 2020-10-26 ENCOUNTER — TELEPHONE (OUTPATIENT)
Dept: DERMATOLOGY | Facility: CLINIC | Age: 65
End: 2020-10-26

## 2020-10-26 NOTE — TELEPHONE ENCOUNTER
[facility] Call Center    Phone Message    May a detailed message be left on voicemail: yes     Reason for Call: Symptoms or Concerns     If patient has red-flag symptoms, warm transfer to triage line    Current symptom or concern: The pt states the hives started in the early part of 3 wks ago. He said in the last couple of days they have gotten so bad. The first appt avail is 11.24.20. The pt was told that if it happens again to call and get in right away. Please call the pt to discuss. Thanks.      Symptoms have been present for:  3 day(s)    Has patient previously been seen for this? Yes    By : Greg    Date: previously    Are there any new or worsening symptoms? Yes: see above      Action Taken: Message routed to:  [facility] ([facility]): marlene allergy    Travel Screening: Not Applicable

## 2020-10-28 ENCOUNTER — TELEPHONE (OUTPATIENT)
Dept: ALLERGY | Facility: CLINIC | Age: 65
End: 2020-10-28

## 2020-10-28 NOTE — TELEPHONE ENCOUNTER
[facility] Call Center    Phone Message    May a detailed message be left on voicemail: yes     Reason for Call: Other: Pt still waiting to hear back. This is urgent. Please contact pt today.    Action Taken: Message routed to:  [facility] ([facility]): Allergy    Travel Screening: Not Applicable

## 2020-10-28 NOTE — TELEPHONE ENCOUNTER
Patient reports itchy rash on skin. Scheduled patient for visit on 11/3/20. Advised patient to send in photos through [facility] as well.

## 2020-11-03 ENCOUNTER — OFFICE VISIT (OUTPATIENT)
Dept: ALLERGY | Facility: CLINIC | Age: 65
End: 2020-11-03
Payer: COMMERCIAL

## 2020-11-03 ENCOUNTER — TELEPHONE (OUTPATIENT)
Dept: ALLERGY | Facility: CLINIC | Age: 65
End: 2020-11-03

## 2020-11-03 DIAGNOSIS — H10.10 ALLERGIC RHINOCONJUNCTIVITIS: Primary | ICD-10-CM

## 2020-11-03 DIAGNOSIS — L23.89 ALLERGIC CONTACT DERMATITIS DUE TO OTHER AGENTS: ICD-10-CM

## 2020-11-03 DIAGNOSIS — J30.9 ALLERGIC RHINOCONJUNCTIVITIS: Primary | ICD-10-CM

## 2020-11-03 DIAGNOSIS — L20.89 OTHER ATOPIC DERMATITIS: ICD-10-CM

## 2020-11-03 PROCEDURE — 95004 PERQ TESTS W/ALRGNC XTRCS: CPT | Mod: GC | Performed by: DERMATOLOGY

## 2020-11-03 PROCEDURE — 99215 OFFICE O/P EST HI 40 MIN: CPT | Mod: 25 | Performed by: DERMATOLOGY

## 2020-11-03 PROCEDURE — 95024 IQ TESTS W/ALLERGENIC XTRCS: CPT | Mod: GC | Performed by: DERMATOLOGY

## 2020-11-03 RX ORDER — DOXEPIN HYDROCHLORIDE 25 MG/1
25 CAPSULE ORAL AT BEDTIME
Qty: 60 CAPSULE | Refills: 0 | Status: SHIPPED | OUTPATIENT
Start: 2020-11-03 | End: 2021-01-02

## 2020-11-03 RX ORDER — EMOLLIENT BASE
CREAM (GRAM) TOPICAL 2 TIMES DAILY
Qty: 453 G | Refills: 4 | Status: SHIPPED | OUTPATIENT
Start: 2020-11-03

## 2020-11-03 RX ORDER — MOMETASONE FUROATE 1 MG/G
OINTMENT TOPICAL
Qty: 45 G | Refills: 3 | Status: SHIPPED | OUTPATIENT
Start: 2020-11-03 | End: 2021-01-11

## 2020-11-03 RX ORDER — FEXOFENADINE HCL 180 MG/1
180 TABLET ORAL EVERY MORNING
Qty: 60 TABLET | Refills: 1 | Status: SHIPPED | OUTPATIENT
Start: 2020-11-03 | End: 2021-01-02

## 2020-11-03 RX ORDER — TACROLIMUS 1 MG/G
OINTMENT TOPICAL
Qty: 60 G | Refills: 3 | Status: SHIPPED | OUTPATIENT
Start: 2020-11-03 | End: 2021-01-03

## 2020-11-03 ASSESSMENT — PAIN SCALES - GENERAL: PAINLEVEL: NO PAIN (0)

## 2020-11-03 NOTE — PROGRESS NOTES
Note for return visit for Dermato-Allergy       Encounter Date: Nov 3, 2020    History of Present Illness:  Jaspreet Ridley is a 65 year old male who presents in person to the consult following information has been take directly from the prior notes, patients informations, Epic and/or other sources and exam/history performed by myself:     Patient previously seen for itching and rash. Was to follow up in August 2020 for Prick/Patch testing, but his rash had resolved and so he cancelled the testing. He says that now it has returned again within the last 10 days. He is here today to try and figure out what is going on.     Eats red meat (not major quantities) and no problem    Additional comments and observations from review of the patient s chart including the following:    See notes for more details    ROS: Patient generally feeling well today   Physical Examination:  General: Well-appearing, appropriately-developed individual.  - Skin: Focused examination of the waste up within the consultation was performed.   - there is diffuse erythema of the scalp with some light greasy scale  - there is diffuse erythema of the chest, less so on the upper back and shoulders  As above in HPI. No additional skin concerns.  - upper respiratory tract: currently no obvious Rhinitis  - lungs: no signs for active and present Asthma/Wheezing or coughing  - eyes: currently no active conjunctivits  - GI system/digestion: currently no problems, no bloating or Diarrhoea      Earlier History and Allergy exams:    Had a flare up on 06/26 a flare up of a dermatitis on the right upper and lower arm and the lesions seem to appear always on same location and seem to stay longer than 24h and skin stays dry (now today still slight erythema and dryness)      Earlier History and Allergy exams:  I have reviewed the teledermatology  information and the nursing intake corresponding to this issue. Jaspreet Ridley is a 65 year old male who presents as a  teledermatology consult for the following information take directly from the prior notes and phone/video call performed by myself:   Monday morning after mothers day patient had a reaction (woke up). Went to sleep on Sunday evening (went to sleep around 11pm and woke up about 2h later with sweating and had chest and neck again burning and itching sensations and next morning some on the arm pits.   Had this Sunday 2 eggs for breakfast and bread for brunch and dinner around 7/8pm and ate Chicken Fajitas. No alcohol. No additional pain medications except daily Oxycodon for chronic neck pain.   --> patient did not eat red meat on this day  --> however, patient did not do the treatment with Doxepin or Singulair and took only Allegra in the morning (Compliance?).    Patient has almost every 2nd evening these sweatings and itching attacks and therefore not better (however, compliance not very good)    Previous History of Present Illness:  04/07/2020 virtual consultation:  Middle December 2019 first on head itching and then over the strap of the CEPAP sleep apnoe mask and then over neck and later over the shoulders. First week of February in went to Dermatology --> diagnosed Folliculitis and got antibiotic and topicals. Since then got urticarial lesions all over the upper body.   Patient travelled much for work and beginning of February went to [place].   Patient had a more severe reaction with lip swelling and breathing problems on 4th February woke up with Urticaria and around 10:30 patient was short of breath (not really angioedema).  Flew then the same evening from [place] to [place]. On the morning of the 5th of February bottom lip all puffy and swollen, not really short of breath. In ER with antihistamines itch went away and steroid injection.  After 6-7 days Urticaria came back and then went back to [facility]. They took there a biopsy and there again steroid injection.     Usually the lesions appear after  sweating and they can itch or burn. After 3-4hours the lesions are still there.      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   - history of hay fever and asthma as a child    Current medications:   Tizanidine muscle relaxants since 7 years (Zanaflex) --> stopped 3-4 days and no changes  Oxycodone every day  Metaxolone muscle relaxants  Montelukast from Dr. Yi  Famotidine from Dr. Yi  Vitamine D  Cetirizine 10mg 2xdaily  Diphenhydramine (Benadryl)    No blood pressure medications, no diabetes  Tries to avoid NSAIDs (sometimes Ibuprofen 800mg --> 1-2 times per month)      Allergy History:     Allergies   Allergen Reactions     Alpha-Gal Fatigue, Headache, Hives, Rash and Shortness Of Breath     Bee Pollen Anaphylaxis     Iodine Anaphylaxis     Contrast Dye      Fentanyl Hives     Iodine-131      --> as child pollen allergies. Still today in fall with ragweed rhinoconjunctivitis  --> as a child Asthma and had Immunotherapy    Social History:  The patient works as a . Patient has the following hobbies or non-occupational exposure     reports that he has quit smoking. He does not have any smokeless tobacco history on file.      Family History:  No family history on file.    Medications:  Current Outpatient Medications   Medication Sig Dispense Refill     cetirizine (ZYRTEC) 10 MG tablet Take 40-50 mg by mouth        diphenhydrAMINE (BENADRYL) 25 MG tablet Take 25-50 mg by mouth       doxepin (SINEQUAN) 25 MG capsule Take 1 capsule (25 mg) by mouth At Bedtime 60 capsule 0     doxepin (SINEQUAN) 25 MG capsule Take 1 capsule (25 mg) by mouth At Bedtime 30 capsule 1     metaxalone (SKELAXIN) 800 MG tablet 800 mg       Misc. Devices (CERVICAL TRACTION) KIT Over the Door Cervical Traction.  May be obtained through [employer] or ordered from the internet.  Traction weight from 8 to 15 pounds.  Not to exceed more than 15 pounds of traction weight.  Begin 10 minutes of traction daily and increase to 15 minutes, two to three times every day. 1 kit 0     montelukast (SINGULAIR) 10 MG tablet Take 1 tablet (10 mg) by mouth At Bedtime 60 tablet 0     montelukast (SINGULAIR) 10 MG tablet Take 10 mg by mouth every evening       oxyCODONE IR (ROXICODONE) 10 MG tablet Take 10 mg by mouth daily       predniSONE (DELTASONE) 50 MG tablet Emergency set: 2 tablet 3     tiZANidine (ZANAFLEX) 4 MG tablet Take 4 mg by mouth At Bedtime       vitamin D2 (ERGOCALCIFEROL) 59145 units (1250 mcg) capsule Take 50,000 Units by mouth         Allergy Tests:    Past Allergy Test: in the 60s = ragweed, dust mites, feathers    Future Allergy Test:   >> plan in future atopy screen prick test and maybe oral provocation test with red meat    Order for PATCH TESTS    [x] Outpatient  [] Inpatient: [facility]..../ Bed ....      Skin Atopy (atopic dermatitis) [x] Yes   [] No since Dec 2019  Rhinitis/Sinusitis:   [x] Yes   [] No as child with positive skin tests and IT as child  Allergic Asthma:   [x] Yes   [] No ? As child  Food Allergy:   [x] Yes   [] No  Leg ulcers:   [] Yes   [] No  Hand eczema:   [] Yes   [] No   Leading hand:   [] R   [] L       [] Ambidextrous                        Reason for tests (suspected allergy): recurrent dermatitis on extremities DDx atopic dermatitis and/or allergic contact dermatitis  Known previous allergies:  none    Standardized panels  [x] Standard panel (40 tests)  [x] Preservatives & Antimicrobials (31 tests)  [x] Emulsifiers & Additives (25 tests)   [x] Perfumes/Flavours & Plants (25 tests) wife uses essential oil diffuser  [] Hairdresser panel (12 tests)  [x] Rubber Chemicals (22 tests)  [] Plastics (26 tests)  [] Colorants/Dyes/Food additives (20 tests)  [] Metals (implants/dental) (24 tests)  [] Local anaesthetics/NSAIDs (14 tests)  [] Antibiotics & Antimycotics (14 tests)   [x] Corticosteroids (15 tests)   [] Photopatch test (62 tests)   [] others: ...      [x] Patient's own products: Benadryl cream (make it worse), Calamine lotion   and whichhazel    DO NOT test if chemical or biological identity is unknown!     always ask from patient the product information and safety sheets (MSDS)   [x] Atopy screen prick test (Atopic predisposition): if possible IDT molds and HDM    [] Patient needs consultation with Allergy team (changes of tests may apply)  [] Tests discussed with Allergy team (can have direct appointment for patch tests)    Atopy Screen (Placed 11/03/20 )    No Substance Readings (15 min) Evaluation   POS Histamine 1mg/ml ++    NEG NaCl 0.9% -      No Substance Readings (15 min) Evaluation   1 Alternaria alternata (tenuis)  ++    2 Cladosporium herbarum -    3 Aspergillus fumigatus -    4 Penicillium notatum -    5 Dermatophagoides pteronyssinus -    6 Dermatophagoides farinae -    7 Dog epithelium (canis spp) -    8 Cat hair (marco catus) -    9 Cockroach   (Blatella americana & germanica) -    10 Grass mix midwest   (Kellee, Orchard, Redtop, Francisco Javier) -    11 Jj grass (sorghum halepense) -    12 Weed mix   (common Cocklebur, Lamb s quarters, rough redroot Pigweed, Dock/Sorrel) +    13 Mug wort (artemisia vulgare) +    14 Ragweed giant/short (ambrosia spp) -    15 English Plantain (plantago lanceolata) -    16 Tree mix 1 (Pecan, Maple BHR, Oak RVW, american Helvetia, black Iola) +    17 Red cedar  (juniperus virginia) +    18 Tree mix 2   (white Frederic, river/red Birch, black Kimberton, common [place]pee, american Elm) -    19 Box elder/Maple mix (acer spp) -    20 Davis shagbark (carya ovata) -       -      Conclusion: patient is atopic with sensitization to Alternaria mold and some pollens (weed/tree)      Intradermal Testing (Placed 11/03/20 )    No Substance Conc.  Readings (15min) Evaluation   - NaCl  0.9% neg    + Histamine (prick) 0.1mg / ml -    DF Standard Dust Mite - D. Farinae 1:10 neg    DP Standard Dust Mite - D. Pteronyssinus 1:10 neg    A Aspergillus fumigatus  1:10 + 7mmP/E   P Penicillium notatum 1:10 neg 5mmP, no E     Conclusion: slight reaction in intradermal test to Aspergillus, but not to dust mites after 15min. 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     Alpha Gal antibodies positives    Maybe combination of red meat and NSAIDs (Ibuprofen can lead to the angioedemas)    Emergency set: Prednisone 100mg and 360mg Fexofenadine  --> avoid eating red meat and particularly taking NSAIDs at the same time    >> Patient had anaphylactic reaction with generalized Urticaria and breathing problems to bee and wasp      Patient counseling:  - Doxepin 25mg every evening  - Fexofenadine 180mg every morning   --> avoid red meat  --> if possible do not take Ibuprofen Tabl (can take instead Tylenol if necessary), but write down when taken    >> take care of the atopic skin:    Vanicream cream 2x daily entire body    Free&Clear Shampoo and soap    Mometasone ointment on Saturday and Sunday on the  lesions    Protopic ointment 0.1% from Monday to Friday on lesions    --> if rash gets worse, then consider use of Dupixent s.c.  --> patient will make photo and feed back to us if any delayed reaction to intradermal tests      Blood tests to be tested (involved nurse):  Total IgE, IgE bee, IgE wasp, Tryptase, CBC, HbA1c      Follow-up: in Derm-Allergy clinic 6 weeks for patch testing      Thank you for the opportunity be involved in the care of this patient.     Staff:    Keesha Garces & PENNY Jacinto MD, PhD  Med-Derm PGY-5    Staff Physician Comments:  I saw and evaluated the patient with the resident and I agree with the assessment and plan as documented in the resident's note.    Jonas Garza MD  Professor  Head of Dermato-Allergy Division  Department of Dermatology  [facility]        I spent a total of 40 minutes face to face with Jaspreet Ridley during today s office visit. Over 50% of this time was spent discussing all the individual test results, correlating them to the clinical relevance, counseling the patient and/or coordinating care. Please see Assessment and Plan for details.  This excludes any time spent performing prick and intradermal tests

## 2020-11-03 NOTE — LETTER
"    11/3/2020         RE: Jaspreet Ridley  [address] [place] [address]        Dear Colleague,    Thank you for referring your patient, Jaspreet Ridley, to the [facility] [place]. Please see a copy of my visit note below.    Patient is here today for a rash that has returned. Patient was scheduled for patch testing for the beginning of August. Patient did not have patch testing because he used a cream and rash went away.   Patient states at the end of August/beginning of September he started to get a rash/itch spot on right shoulder, used cream and resolved.   Then he started to chest a rash and itching on right side oc chest, used cream for 5/6 days and it resolved.   3 weeks ago his scalp and beard started to \"itch terribly\" - and 1.5 weeks ago took 1 dose of prednisone. Patient called for appointment.   Patient states his itching and rash are still coming back.   Patient has NOT rescheduled for patch/prick testing yet, he wants to wait until after today's visit.               Note for return visit for Dermato-Allergy       Encounter Date: Nov 3, 2020    History of Present Illness:  Jaspreet Ridley is a 65 year old male who presents in person to the consult following information has been take directly from the prior notes, patients informations, Epic and/or other sources and exam/history performed by myself:     Patient previously seen for itching and rash. Was to follow up in August 2020 for Prick/Patch testing, but his rash had resolved and so he cancelled the testing. He says that now it has returned again within the last 10 days. He is here today to try and figure out what is going on.     Eats red meat (not major quantities) and no problem    Additional comments and observations from review of the patient s chart including the following:    See notes for more details    ROS: Patient generally feeling well today   Physical Examination:  General: Well-appearing, " appropriately-developed individual.  - Skin: Focused examination of the waste up within the consultation was performed.   - there is diffuse erythema of the scalp with some light greasy scale  - there is diffuse erythema of the chest, less so on the upper back and shoulders  As above in HPI. No additional skin concerns.  - upper respiratory tract: currently no obvious Rhinitis  - lungs: no signs for active and present Asthma/Wheezing or coughing  - eyes: currently no active conjunctivits  - GI system/digestion: currently no problems, no bloating or Diarrhoea      Earlier History and Allergy exams:    Had a flare up on 06/26 a flare up of a dermatitis on the right upper and lower arm and the lesions seem to appear always on same location and seem to stay longer than 24h and skin stays dry (now today still slight erythema and dryness)      Earlier History and Allergy exams:  I have reviewed the teledermatology  information and the nursing intake corresponding to this issue. Jaspreet Ridley is a 65 year old male who presents as a teledermatology consult for the following information take directly from the prior notes and phone/video call performed by myself:   Monday morning after mothers day patient had a reaction (woke up). Went to sleep on Sunday evening (went to sleep around 11pm and woke up about 2h later with sweating and had chest and neck again burning and itching sensations and next morning some on the arm pits.   Had this Sunday 2 eggs for breakfast and bread for brunch and dinner around 7/8pm and ate Chicken Fajitas. No alcohol. No additional pain medications except daily Oxycodon for chronic neck pain.   --> patient did not eat red meat on this day  --> however, patient did not do the treatment with Doxepin or Singulair and took only Allegra in the morning (Compliance?).    Patient has almost every 2nd evening these sweatings and itching attacks and therefore not better (however, compliance not very  good)    Previous History of Present Illness:  04/07/2020 virtual consultation:  Middle December 2019 first on head itching and then over the strap of the CEPAP sleep apnoe mask and then over neck and later over the shoulders. First week of February in went to Dermatology --> diagnosed Folliculitis and got antibiotic and topicals. Since then got urticarial lesions all over the upper body.   Patient travelled much for work and beginning of February went to [place].   Patient had a more severe reaction with lip swelling and breathing problems on 4th February woke up with Urticaria and around 10:30 patient was short of breath (not really angioedema).  Flew then the same evening from [place] to [place]. On the morning of the 5th of February bottom lip all puffy and swollen, not really short of breath. In ER with antihistamines itch went away and steroid injection.  After 6-7 days Urticaria came back and then went back to [facility]. They took there a biopsy and there again steroid injection.     Usually the lesions appear after sweating and they can itch or burn. After 3-4hours the lesions are still there.      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   - history of hay fever and asthma as a child    Current medications:   Tizanidine muscle relaxants since 7 years (Zanaflex) --> stopped 3-4 days and no changes  Oxycodone every  day  Metaxolone muscle relaxants  Montelukast from Dr. Yi  Famotidine from Dr. Yi  Vitamine D  Cetirizine 10mg 2xdaily  Diphenhydramine (Benadryl)    No blood pressure medications, no diabetes  Tries to avoid NSAIDs (sometimes Ibuprofen 800mg --> 1-2 times per month)      Allergy History:     Allergies   Allergen Reactions     Alpha-Gal Fatigue, Headache, Hives, Rash and Shortness Of Breath     Bee Pollen Anaphylaxis     Iodine Anaphylaxis     Contrast Dye      Fentanyl Hives     Iodine-131      --> as child pollen allergies. Still today in fall with ragweed rhinoconjunctivitis  --> as a child Asthma and had Immunotherapy    Social History:  The patient works as a . Patient has the following hobbies or non-occupational exposure     reports that he has quit smoking. He does not have any smokeless tobacco history on file.      Family History:  No family history on file.    Medications:  Current Outpatient Medications   Medication Sig Dispense Refill     cetirizine (ZYRTEC) 10 MG tablet Take 40-50 mg by mouth       diphenhydrAMINE (BENADRYL) 25 MG tablet Take 25-50 mg by mouth       doxepin (SINEQUAN) 25 MG capsule Take 1 capsule (25 mg) by mouth At Bedtime 60 capsule 0     doxepin (SINEQUAN) 25 MG capsule Take 1 capsule (25 mg) by mouth At Bedtime 30 capsule 1     metaxalone (SKELAXIN) 800 MG tablet 800 mg       Misc. Devices (CERVICAL TRACTION) KIT Over the Door Cervical Traction.  May be obtained through [employer] or ordered from the internet.  Traction weight from 8 to 15 pounds.  Not to exceed more than 15 pounds of traction weight.  Begin 10 minutes of traction daily and increase to 15 minutes, two to three times every day. 1 kit 0     montelukast (SINGULAIR) 10 MG tablet Take 1 tablet (10 mg) by mouth At Bedtime 60 tablet 0     montelukast (SINGULAIR) 10 MG tablet Take 10 mg by mouth every evening       oxyCODONE IR (ROXICODONE) 10 MG tablet Take 10 mg by mouth daily        predniSONE (DELTASONE) 50 MG tablet Emergency set: 2 tablet 3     tiZANidine (ZANAFLEX) 4 MG tablet Take 4 mg by mouth At Bedtime       vitamin D2 (ERGOCALCIFEROL) 59319 units (1250 mcg) capsule Take 50,000 Units by mouth         Allergy Tests:    Past Allergy Test: in the 60s = ragweed, dust mites, feathers    Future Allergy Test:   >> plan in future atopy screen prick test and maybe oral provocation test with red meat    Order for PATCH TESTS    [x] Outpatient  [] Inpatient: [facility]..../ Bed ....      Skin Atopy (atopic dermatitis) [x] Yes   [] No since Dec 2019  Rhinitis/Sinusitis:   [x] Yes   [] No as child with positive skin tests and IT as child  Allergic Asthma:   [x] Yes   [] No ? As child  Food Allergy:   [x] Yes   [] No  Leg ulcers:   [] Yes   [] No  Hand eczema:   [] Yes   [] No   Leading hand:   [] R   [] L       [] Ambidextrous                        Reason for tests (suspected allergy): recurrent dermatitis on extremities DDx atopic dermatitis and/or allergic contact dermatitis  Known previous allergies: none    Standardized panels  [x] Standard panel (40 tests)  [x] Preservatives & Antimicrobials (31 tests)  [x] Emulsifiers & Additives (25 tests)   [x] Perfumes/Flavours & Plants (25 tests) wife uses essential oil diffuser  [] Hairdresser panel (12 tests)  [x] Rubber Chemicals (22 tests)  [] Plastics (26 tests)  [] Colorants/Dyes/Food additives (20 tests)  [] Metals (implants/dental) (24 tests)  [] Local anaesthetics/NSAIDs (14 tests)  [] Antibiotics & Antimycotics (14 tests)   [x] Corticosteroids (15 tests)   [] Photopatch test (62 tests)   [] others: ...      [x] Patient's own products: Benadryl cream (make it worse), Calamine lotion   and whichhazel    DO NOT test if chemical or biological identity is unknown!     always ask from patient the product information and safety sheets (MSDS)   [x] Atopy screen prick test (Atopic predisposition): if possible IDT molds and HDM    [] Patient needs  consultation with Allergy team (changes of tests may apply)  [] Tests discussed with Allergy team (can have direct appointment for patch tests)    Atopy Screen (Placed 11/03/20 )    No Substance Readings (15 min) Evaluation   POS Histamine 1mg/ml ++    NEG NaCl 0.9% -      No Substance Readings (15 min) Evaluation   1 Alternaria alternata (tenuis)  ++    2 Cladosporium herbarum -    3 Aspergillus fumigatus -    4 Penicillium notatum -    5 Dermatophagoides pteronyssinus -    6 Dermatophagoides farinae -    7 Dog epithelium (canis spp) -    8 Cat hair (marco catus) -    9 Cockroach   (Blatella americana & germanica) -    10 Grass mix midwest   (Kellee, Orchard, Redtop, Francisco Javier) -    11 Jj grass (sorghum halepense) -    12 Weed mix   (common Cocklebur, Lamb s quarters, rough redroot Pigweed, Dock/Sorrel) +    13 Mug wort (artemisia vulgare) +    14 Ragweed giant/short (ambrosia spp) -    15 English Plantain (plantago lanceolata) -    16 Tree mix 1 (Pecan, Maple BHR, Oak RVW, american [place], black [place]) +    17 Red cedar (juniperus virginia) +    18 Tree mix 2   (white Frederic, river/red Birch, black [place], common [place], american Elm) -    19 Box elder/Maple mix (acer spp) -    20 [place] shagbark (carya ovata) -       -      Conclusion: patient is atopic with sensitization to Alternaria mold and some pollens (weed/tree)      Intradermal Testing (Placed 11/03/20 )    No Substance Conc.  Readings (15min) Evaluation   - NaCl  0.9% neg    + Histamine (prick) 0.1mg / ml -    DF Standard Dust Mite - D. Farinae 1:10 neg    DP Standard Dust Mite - D. Pteronyssinus 1:10 neg    A Aspergillus fumigatus  1:10 + 7mmP/E   P Penicillium notatum 1:10 neg 5mmP, no E     Conclusion: slight reaction in intradermal test to Aspergillus, but not to dust mites after 15min. 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     Alpha Gal antibodies positives    Maybe combination of red meat and NSAIDs (Ibuprofen can lead to the angioedemas)    Emergency set: Prednisone 100mg and 360mg Fexofenadine  --> avoid eating red meat and particularly taking NSAIDs at the same time    >> Patient had anaphylactic reaction with generalized Urticaria and breathing problems to bee and wasp      Patient counseling:  - Doxepin 25mg every evening  - Fexofenadine 180mg every morning   --> avoid red meat  --> if possible do not take Ibuprofen Tabl (can take instead Tylenol if necessary), but write down when taken    >> take care of the atopic skin:    Vanicream cream 2x daily entire body    Free&Clear Shampoo and soap    Mometasone ointment on Saturday and Sunday on the lesions    Protopic ointment 0.1% from Monday to Friday on lesions    --> if rash gets worse, then consider use of Dupixent s.c.  --> patient will make photo and feed back to us if any delayed reaction to intradermal tests      Blood tests to be tested (involved nurse):  Total IgE, IgE bee, IgE wasp, Tryptase, CBC, HbA1c      Follow-up: in Derm-Allergy clinic 6 weeks for patch testing      Thank you for the opportunity be involved in the care of this patient.     Staff:    Keesha Garces & PENNY Jacinto MD, PhD  Med-Derm PGY-5    Staff Physician Comments:  I saw and evaluated the patient with the resident and I agree with the assessment and plan as documented in the resident's note.    Jonas Garza MD  Professor  Head of Dermato-Allergy Division  Department of Dermatology  [facility]        I spent a total of 40 minutes face to face with Jaspreet SCHMITZ  Khai during today s office visit. Over 50% of this time was spent discussing all the individual test results, correlating them to the clinical relevance, counseling the patient and/or coordinating care. Please see Assessment and Plan for details.  This excludes any time spent performing prick and intradermal tests      Again, thank you for allowing me to participate in the care of your patient.        Sincerely,        Jonas Garza MD

## 2020-11-03 NOTE — PATIENT INSTRUCTIONS
Patch Testing Information  What are allergen patch tests?    The test is done to look for skin allergies that may be causing rashes and irritation.    A patch test is a way of identifying whether a substance has caused a delayed reaction with skin inflammation, such as contact eczema or delayed (after days) reactions to drugs.     We will use various types of test compounds, which may include common allergens you may come in contact with in daily life such as preservatives, fragrances or even drugs.     Most of the time we will use standardized, prefabricated test solutions. The choice of the substances and series tested will depend on your history of reactions. Sometimes we will test your own products as well.     In order to avoid severe toxic reactions we need detailed information or safety sheets for each of the test compounds.    The test panels are set up with a small amount of common substances that cause skin allergies. They are taped to your skin with a clear hypoallergenic bandage and reinforced hypoallergenic tape.    The substances are numbered, so it is easy to tell what is causing a skin reaction.  What do I need to do in preparation for the test?    Stop all systemic steroids 1 month prior to the testing.     Stop applying topical steroids to the test area one week prior to the test. It is to use them elsewhere throughout the testing process. If this is not possible then discuss options with the Allergy specialist.    Do not go sunbathing or tanning for one week prior to testing    Please wear dark colors the week of the test since we will write on you with a dark marker that may transfer and stain clothing or bedding.     Some medications can affect the reactions to allergens during the tests. Therefore reveal all the medications you take to the allergy team. The doctor will discuss the medications with you before the tests.     Eat how you normally would.    Avoid the following:    You cannot get  the test area wet during the entire test period. This means no bathing or swimming the entire test period.    No strenuous exercise that may cause sweating.    Do not scratch the test area, this can cause the allergen to spread and give us false positives.     Avoid exposure to UV irradiation. This means no tanning or UV treatment during the testing period.   What can I expect?    Patch testing is done over three appointments.   o The usual schedule is: Monday (Allergen patches are placed), Wednesday (Patches are removed and skin is examined by the MD), and Friday (Skin is examined by the MD)      If you are allergic, there will be an area of irritation where the test was placed.    Itching or burning at the test site might happen if you are allergic to the allergen.  Do not rub or scratch the test site since this may spread the allergen and possibly cause false positives. If itching or burning is not tolerable please contact the clinic.    The marker we draw on your back with ma take up to 5 weeks to wash off completely. Rubbing alcohol can help speed up this process.     Reactions can occur 1 to 2 weeks after the tests are applied. If this happens please take photos of the area and contact the clinic.  What should I do after the tests are placed?    Keep the area dry. No showering or getting the test area wet from the time we see you at your first visit until after your third appointment. If you get the test area wet you are washing off the test and we could get false negative reactions.    If you notice any of the test patches coming loose put on more tape to re-secure the test.    If the marker that is applied fades you can use a dark pen to maury around the panel sites.    Cover the test area when you are outside to avoid any sun exposure while the test is in place.     You can remove the tests only if there is a severe reaction (itch, pain, blistering). Please report this to your doctor immediately. If you have  to remove the tests please maury the locations of each test field with a grid so we can identify the allergen.  WHAT ARE THE POSSIBLE RISKS OF PATCH TESTS     If you are allergic to a compound tested you will develop a localized skin reaction similar to your previous reaction, this may take days to develop. These reactions include a formation of red, itchy skin lesions that could be about a centimeter with small vesicles or possibly even blisters. The lesions will fade over time, this may take weeks. The test might leave some skin pigmentation for a few months.     In rare cases a localized reaction to patch testing can become generalized.     The tests with your own products might have some risks because they are not standardized and unanticipated reactions could occur. We need as much information as possible to evaluate if your own products are safe to test and under what conditions. This has to be evaluated for each individual product.   Useful Contact Information    To contact your doctor you can either send a [facility] message or call [phone]    Address  o [address] [place] [address], [address]    If you develop any serious or adverse allergic reaction after office hours please seek immediate medical assistance at the nearest clinic, urgent care, or emergency room.        Standardized panels  [x] Standard panel (40 tests)  [x] Preservatives & Antimicrobials (31 tests)  [x] Emulsifiers & Additives (25 tests)   [x] Perfumes/Flavours & Plants (25 tests) wife uses essential oil diffuser  [] Hairdresser panel (12 tests)  [x] Rubber Chemicals (22 tests)  [] Plastics (26 tests)  [] Colorants/Dyes/Food additives (20 tests)  [] Metals (implants/dental) (24 tests)  [] Local anaesthetics/NSAIDs (14 tests)  [] Antibiotics & Antimycotics (14 tests)   [x] Corticosteroids (15 tests)   [] Photopatch test (62 tests)   [] others: ...      [x] Patient's own products: Benadryl cream (make it worse), Calamine lotion    and whichhazel    DO NOT test if chemical or biological identity is unknown!     always ask from patient the product information and safety sheets (MSDS)   [x] Atopy screen prick test (Atopic predisposition): if possible IDT molds and HDM    [] Patient needs consultation with Allergy team (changes of tests may apply)  [] Tests discussed with Allergy team (can have direct appointment for patch tests)    Atopy Screen (Placed 11/03/20 )    No Substance Readings (15 min) Evaluation   POS Histamine 1mg/ml ++    NEG NaCl 0.9% -      No Substance Readings (15 min) Evaluation   1 Alternaria alternata (tenuis)  ++    2 Cladosporium herbarum -    3 Aspergillus fumigatus -    4 Penicillium notatum -    5 Dermatophagoides pteronyssinus -    6 Dermatophagoides farinae -    7 Dog epithelium (canis spp) -    8 Cat hair (marco catus) -    9 Cockroach   (Blatella americana & germanica) -    10 Grass mix midwest   (Kellee, Orchard, Redtop, Francisco Javier) -    11 Jj grass (sorghum halepense) -    12 Weed mix   (common Cocklebur, Lamb s quarters, rough redroot Pigweed, Dock/Sorrel) +    13 Mug wort (artemisia vulgare) +    14 Ragweed giant/short (ambrosia spp) -    15 English Plantain (plantago lanceolata) -    16 Tree mix 1 (Pecan, Maple BHR, Oak RVW, american [place], black [place]) +    17 Red cedar (juniperus virginia) +    18 Tree mix 2   (white Frederic, river/red Birch, black [place], common [place], american Elm) -    19 Box elder/Maple mix (acer spp) -    20 Page shagbark (carya ovata) -       -      Conclusion: patient is atopic with sensitization to Alternaria mold and some pollens (weed/tree)      Intradermal Testing (Placed 11/03/20 )    No Substance Conc.  Readings (15min) Evaluation   - NaCl  0.9% neg    + Histamine (prick) 0.1mg / ml -    DF Standard Dust Mite - D. Farinae 1:10 neg    DP Standard Dust Mite - D. Pteronyssinus 1:10 neg    A Aspergillus fumigatus  1:10 + 7mmP/E   P Penicillium notatum 1:10 neg 5mmP, no E      Conclusion: slight reaction in intradermal test to Aspergillus, but not to dust mites after 15min. 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     Alpha Gal antibodies positives    Maybe combination of red meat and NSAIDs (Ibuprofen can lead to the angioedemas)    Emergency set: Prednisone 100mg and 360mg Fexofenadine  --> avoid eating red meat and particularly taking NSAIDs at the same time    >> Patient had anaphylactic reaction with generalized Urticaria and breathing problems to bee and wasp      Patient counseling:  - Doxepin 25mg every evening  - Fexofenadine 180mg every morning   --> avoid red meat  --> if possible do not take Ibuprofen Tabl (can take instead Tylenol if necessary), but write down when taken    >> take care of the atopic skin:    Vanicream cream 2x daily entire body    Free&Clear Shampoo and soap    Mometasone ointment on Saturday and Sunday on the lesions    Protopic ointment 0.1% from Monday to Friday on lesions    --> if rash gets worse, then consider use of Dupixent s.c.  --> patient will make photo and feed back to us if any delayed reaction to intradermal tests

## 2020-11-03 NOTE — LETTER
Dear Insurance Carrier,      One of your enrolled members, Jaspreet Ridley , has been referred by Dr.Paul Garza, who is a dermatoallergist, for Comprehensive Patch Testing. Comprehensive patch testing is medically   necessary for this patient.    COMPREHENSIVE PATCH TESTING IS INDICATED FOR PATIENTS WHO HAVE  CHRONIC DERMATITIS THAT HAS NOT IMPROVED AFTER RECEIVING  AGGRESSIVE TREATMENT BY DERMATOLOGISTS AND/OR ALLERGISTS  AND WHICH SEVERELY IMPACTS THE INDIVIDUAL S HEALTH AND  QUALITY OF LIFE.    This patient meets the above criteria and thus requires comprehensive patch testing. This  is likely to entail more than 80 units of CPT code 51714. In addition, Dr. Garza will spend  a substantial amount of time working to determine the cause of the dermatitis and then is  able to more precisely develop a personalized treatment plan.    This Comprehensive Patch Testing cannot be performed by most allergists or  dermatologists, who are only able to perform limited patch testing, which is inadequate or  inappropriate for the diagnosis of possible contact allergy.    Please see the following document for additional information and many peer reviewed  references on the medical necessity of Comprehensive Patch Testing.

## 2020-11-03 NOTE — TELEPHONE ENCOUNTER
Standardized panels  [x]? Standard panel (40 tests)  [x]? Preservatives & Antimicrobials (31 tests)  [x]? Emulsifiers & Additives (25 tests)   [x]? Perfumes/Flavours & Plants (25 tests) wife uses essential oil diffuser  []? Hairdresser panel (12 tests)  [x]? Rubber Chemicals (22 tests)  []? Plastics (26 tests)  []? Colorants/Dyes/Food additives (20 tests)  []? Metals (implants/dental) (24 tests)  []? Local anaesthetics/NSAIDs (14 tests)  []? Antibiotics & Antimycotics (14 tests)   [x]? Corticosteroids (15 tests)   []? Photopatch test (62 tests)   []? others: ...                      [x]? Patient's own products: Benadryl cream (make it worse), Calamine lotion                      and whichhazel    DO NOT test if chemical or biological identity is unknown!     always ask from patient the product information and safety sheets (MSDS)   [x]? Atopy screen prick test (Atopic predisposition): if possible IDT molds and HDM  []? Patient needs consultation with Allergy team (changes of tests may apply)  []? Tests discussed with Allergy team (can have direct appointment for patch tests)  Impression/Plan:  >> Pruritic, eczematous rash on face, trunk and extremities DDx atopic dermatitis or allergic contact dermatitis (Benadryl cream)  ? Prick test and intradermal testing today = atopic with sensitization to mold (Alternaria)  ? 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 Alpha Gal antibodies positives  ? Maybe combination of red meat and NSAIDs (Ibuprofen can lead to the angioedemas)  ? Emergency set: Prednisone 100mg and 360mg Fexofenadine  --> avoid eating red meat and particularly taking NSAIDs at the same time  >> Patient had anaphylactic reaction with generalized Urticaria and breathing problems to bee and wasp  Patient counseling:  - Doxepin 25mg every evening  - Fexofenadine 180mg every morning   --> avoid red  meat  --> if possible do not take Ibuprofen Tabl (can take instead Tylenol if necessary), but write down when taken  >> take care of the atopic skin:    Vanicream cream 2x daily entire body    Free&Clear Shampoo and soap    Mometasone ointment on Saturday and Sunday on the lesions    Protopic ointment 0.1% from Monday to Friday on lesions  --> if rash gets worse, then consider use of Dupixent s.c.  --> patient will make photo and feed back to us if any delayed reaction to intradermal tests  Blood tests to be tested (involved nurse):  Total IgE, IgE bee, IgE wasp, Tryptase, CBC, HbA1c  Follow-up: in Derm-Allergy clinic 6 weeks for patch testing      STANDARD Series      No Substance 2 days 4 days remarks   1 Neil Mix [C] - -    2 Colophony - -    3  2-Mercaptobenzothiazole  - -     4 Methylisothiazolinone - -    5 Carba Mix - -    6 Thiuram Mix [A] - -    7 Bisphenol A Epoxy Resin - -    8 D-Kkrx-Yduohfsbpbh-Formaldehyde Resin - -    9 Mercapto Mix [A] - -    10 Black Rubber Mix- PPD [B] - -    11 Potassium Dichromate  -  -    12 Balsam of [place] (Myroxylon Pereirae Resin) - -    13 Nickel Sulphate Hexahydrate - -    14 Mixed Dialkyl Thiourea - -    15 Paraben Mix [B] - -    16 Methyldibromo Glutaronitrile - -    17 Fragrance Mix - -    18 2-Bromo-2-Nitropropane-1,3-Diol (Bronopol) - -    19 Lyral - -    20 Tixocortol-21- Pivalate - -    21 Diazolidiyl Urea (Germall II) - -    22 Methyl Methacrylate - -    23 Cobalt (II) Chloride Hexahydrate - -    24 Fragrance Mix II  - -    25 Compositae Mix - -    26 Benzoyl Peroxide - -    27 Bacitracin - -    28 Formaldehyde - -    29 Methylchloroisothiazolinone / Methylisothiazolinone - -    30 Corticosteroid Mix - -    31 Sodium Lauryl Sulfate - -    32 Lanolin Alcohol - -    33 Turpentine - -    34 Cetylstearylalcohol - -    35 Chlorhexidine Dicluconate - -    36 Budenoside - -    37 Imidazolidinyl Urea  - -    38 Ethyl-2 Cyanoacrylate - -    39 Quaternium 15 (Dowicil 200) - -     40 Decyl Glucoside - -      CORTICOSTEROIDS   No Substance 2 days 4 days remarks Allergy  class   41 1 Amcinonide - -  B   42 2 Betametasone-17,21 Dipropionate - -  D1   43 3 Desoximetasone - -  C   44 4 Betamethasone-17-Valerate - -  D1   45 5 Dexamethasone - -  C   46 6 Hydrocortisone - -  A   47 7 Clobetasol-17-Propionate - -  D1   48 8 Dexamethasone-21-Phosphate Disodium Salt - -  C   49 9 Hydrocortisone-17 Butyrate - -  D2   50 10 Prednisolone - -  A   51 11 Mometason Furoate - -  D1   52 12 Triamcinolone Acetonide - -  B   53 13 Methylprednisolone Aceponate - -  D2   54 14 Hydrocortisone-21-Acetate - -  A   55 15 Prednicarbate - -  D2     Group Characteristics of group Generic name Name  cross reactions   A Hydrocortisone   Cloprednole, Fludrocortisone acétate, Hydrocortison acetate, Methylprednisolone, Prednisolone, Tixocortolpivalate Alfacortone, Fucidin H, Dermacalm, Hexacortone, Premandole, Imacort With group D2   B Triamcinolone-acetonide   Budenoside (R-isomer), Amcinonide, Desonide, Fluocinolone acetonide, Triamcinolone acetonide Locapred, Locatop  Synalar, Pevisone, Ken[place]ort -   C Betamethasone (Without Lashon)   Betamethasone, Dexamethasone, Flumethasone pivalate, Halomethasone Daivobet, Dexasalyl, Locasalen,   -   D1 Betamethasone-diproprionate   Betamethasone dipropionate, Betamethasone-17-valerate, Clobetasole-propionate, Fluticasone propionate, Mometasone furoate Betnovate, Diprogenta, Diprosalic, Diprosone, Celestoderm, Fucicort,  Cutivate, Axotide, Elocom -   D2 Methylprednisolone-aceponate   Hydrocortisone-aceponate, Hydrocortisone-buteprate, Hydrocortisone-17-butyrate, Methylprednisolone aceponate, Prednicarbate Locoïd, Advantan,  Prednitop With group A and Budesonide (S-isomer)     EMULSIFIERS & ADDITIVES   No Substance 2 days 4 days remarks   56 1 Polyethylene Glycol-400 - -    57 2 Cocamidopropyl Betaine - -    58 3 Amerchol L101 - -    59 4 Propylene Glycol - -    60 5 Triethanolamine -  -    61 6 Sorbitane Sesquiolate - -    62 7 Isopropylmyristate - -    63 8 Polysorbate 80  - -    64 9 Amidoamine   (Stearamidopropyl Dimethylamine) - -    65 10 Oleamidopropyl Dimethylamine - -    66 11 Lauryl Glucoside - -    67 12 Coconut Diethanolamide  - -    68 13 2-Hydroxy-4-Methoxy Benzophenone (Oxybenzone) - -    69 14 Benzophenone-4 (Sulisobenzon) - -    70 15 Propolis - -    71 16 Dexpanthenol - -    72 17 Carboxymethyl Cellulose Sodium - -    73 18 Abitol - -    74 19 Tert-Butylhydroquinone - -    75 20 Benzyl Salicylate - -      Antioxidant      76 21 Dodecyl Gallate - -    77 22 Butylhydroxyanisole (BHA) - -    78 23 Butylhydroxytoluene (BHT) - -    79 24 Di-Alpha-Tocopherol (Vit E) - -    80 25 Propyl Gallate - -    81 26 Zinc Pyrithione      82 27 Dimethylaminopropylamin (DMAPA)        PRESERVATIVES & ANTIMICROBIALS     No Substance 2 days 4 days remarks   83 1  1,2-Benzisothiazoline-3-One, Sodium Salt - -    84 2  1,3,5-Ross (2-Hydroxyethyl) - Hexahydrotriazine (Grotan BK) - -    85 3 7-Eebtnagnyahrw-2-Nitro-1, 3-Propanediol - -    86 4  3, 4, 4' - Triclocarban - -    87 5 4 - Chloro - 3 - Cresol - -    88 6 4 - Chloro - 4 - Xylenol (PCMX) - -    89 7 7-Ethylbicyclooxazolidine (Bioban [id number]) - -    90 8 Benzalkonium Chloride - -    91 9 Benzyl Alcohol - -    92 10 Cetalkonium Chloride - -    93 11 Cetylpyrimidine Chloride  - -    94 12 Chloroacetamide - -    95 13 DMDM Hydantoin - -    96 14 Glutaraldehyde - -    97 15 Triclosan - -    98 16 Glyoxal Trimeric Dihydrate - -    99 17 Iodopropynyl Butylcarbamate - -    100 18 Octylisothiazoline - -    101 19 Iodoform - -    102 20 (Nitrobutyl) Morpholine/(Ethylnitro-Trimethylene) Dimorpholine (Bioban P 1487) - -    103 21 Phenoxyethanol - -    104 22 Phenyl Salicylate - -    105 23 Povidone Iodine - -    106 24 Sodium Benzoate - -    107 25 Sodium Disulfite - -    108 26 Sorbic Acid - -    109 27 Thimerosal - -    110 28 Melamine Formeladyde Resin       111 29 Ethylenediamine Dihydrochloride        Parabens      112 30 Butyl-P-Hydroxybenzoate - -    113 31 Ethyl-P-Hydroxybenzoate - -    114 32 Methyl-P-Hydroxybenzoate - -    115 33 Propyl-P-Hydroxybenzoate - -      PERFUMES, FLAVORS & PLANTS      No Substance 2 days 4 days remarks   116 1 Benzyl Cinnamate - -    117 2 Di-Limonene (Dipentene) - -    118 3 Cananga Odorata (Jacqueline Phillips) (I) - -    119 4 Lichen Acid Mix - -    120 5 Mentha Piperita Oil (Peppermint Oil) - -    121 6 Sesquiterpenelactone mix - -    122 7 Tea Tree Oil, Oxidized - -    123 8 Wood Tar Mix - -    124 9 Abietic Acid - -    125 10 Lavendula Angustifolia Oil (Lavender Oil) - -    126 11 Camphor  - -      Fragrance Mix I      127 12 Oakmoss Absolute - -    128 13 Eugenol - -    129 14 Geraniol - -    130 15 Hydroxycitronellal - -    131 16 Isoeugenol - -    132 17 Cinnamic Aldehyde - -    133 18 Cinnamic Alcohol  - -      Fragrance mix II      134 19 Citronellol - -    135 20 Alpha-Hexylcinnamic Aldehyde    - -    136 21 Citral - -    137 22 Farnesol - -    18 23 Coumarin - -      RUBBER CHEMICALS     No Substance 2 days 4 days remarks   139  Carbamate      140 1 Zinc Bis ( Diethyldithio carbamate ) (ZDEC) - -    141 2 Zinc Bis (Dibutyldithiocarbamate) - -    142 3 1,3-Diphenylguanidine (DPG) - -      Thiourea      143 4 Dibutylthiourea - -    144 5 Diphenyltiourea - -    145 6 Thiourea - -      Mercapto chemicals      146 7 Morpholinyl Mercaptobenzothiazole - -    147 8 O-Qzhpgpbvmd-5-Benzothiazyl-Sulfenamide - -    148 9 Dibenzothiazyl Disulfide - -      Thiuram chemicals      149 10 Dipentamethylenethiuram Disulfide - -    150 11 Tetraethylthiuram Disulfide (Disulfiram) - -    151 12 Tetramethylthiuram Disulfide - -    152 13 Tetramethyl Thiuram Monosulfide (TMTM) - -      4-Phenylenediamine derivatives      153 14 N-Isopropyl-N'-Phenyl-P-Phenylenediamine (IPPD) - -    154 15 Gsqckzqg-A-Mjtxijtjhastclde (DPPD) - -      Various Rubber  Additives      155 16 Hydroquinone Monobenzylether  - -    156 17 Hexamethylenetetramine - -    157 18 4,4'-Dihydroxybiphenyl - -    158 19 Cyclohexylthiophtalimide - -    159 20 N-Phenyl-B-Naphthylamine - -    160 21 Dodecyl Mercaptan - -      OTHER PRODUCTS      No Substance Conc  % solv 2 days 4 days remarks   161 1 Benadryl Cream        162 2 Calamine        163 3 Whichhazel        164 4         165 5         166 6         167 7         168 8         169 9         170 10           Results of patch tests:                         Interpretation:    - Negative                    A    = Allergic      (+) Erythema    TI   = Toxic/irritant   + E + Infiltration    RaP = Relevance at Present     ++ E/I + Papulovesicle   Rpr  = Relevance Previously     +++ E/I/P + Blister     nR   = No Relevance

## 2020-11-03 NOTE — PROGRESS NOTES
"Patient is here today for a rash that has returned. Patient was scheduled for patch testing for the beginning of August. Patient did not have patch testing because he used a cream and rash went away.   Patient states at the end of August/beginning of September he started to get a rash/itch spot on right shoulder, used cream and resolved.   Then he started to chest a rash and itching on right side oc chest, used cream for 5/6 days and it resolved.   3 weeks ago his scalp and beard started to \"itch terribly\" - and 1.5 weeks ago took 1 dose of prednisone. Patient called for appointment.   Patient states his itching and rash are still coming back.   Patient has NOT rescheduled for patch/prick testing yet, he wants to wait until after today's visit.             "

## 2020-11-03 NOTE — PROGRESS NOTES
Note for return visit for Dermato-Allergy       Encounter Date: Nov 3, 2020    History of Present Illness:  Jaspreet Ridley is a 65 year old male who presents in person to the consult following information has been take directly from the prior notes, patients informations, Epic and/or other sources and exam/history performed by myself:     Patient previously seen for itching and rash. Was to follow up in August 2020 for Prick/Patch testing, but his rash had resolved and so he cancelled the testing. He says that now it has returned again within the last 10 days. He is here today to try and figure out what is going on.     Additional comments and observations from review of the patient s chart including the following:    See notes for more details    ROS: Patient generally feeling well today   Physical Examination:  General: Well-appearing, appropriately-developed individual.  - Skin: Focused examination of the waste up within the consultation was performed.   - there is diffuse erythema of the scalp with some light greasy scale  - there is diffuse erythema of the chest, less so on the upper back and shoulders  As above in HPI. No additional skin concerns.  - upper respiratory tract: currently no obvious Rhinitis  - lungs: no signs for active and present Asthma/Wheezing or coughing  - eyes: currently no active conjunctivits  - GI system/digestion: currently no problems, no bloating or Diarrhoea      Earlier History and Allergy exams:    Had a flare up on 06/26 a flare up of a dermatitis on the right upper and lower arm and the lesions seem to appear always on same location and seem to stay longer than 24h and skin stays dry (now today still slight erythema and dryness)      Earlier History and Allergy exams:  I have reviewed the teledermatology  information and the nursing intake corresponding to this issue. Jaspreet Ridley is a 65 year old male who presents as a teledermatology consult for the following information  take directly from the prior notes and phone/video call performed by myself:   Monday morning after mothers day patient had a reaction (woke up). Went to sleep on Sunday evening (went to sleep around 11pm and woke up about 2h later with sweating and had chest and neck again burning and itching sensations and next morning some on the arm pits.   Had this Sunday 2 eggs for breakfast and bread for brunch and dinner around 7/8pm and ate Chicken Fajitas. No alcohol. No additional pain medications except daily Oxycodon for chronic neck pain.   --> patient did not eat red meat on this day  --> however, patient did not do the treatment with Doxepin or Singulair and took only Allegra in the morning (Compliance?).    Patient has almost every 2nd evening these sweatings and itching attacks and therefore not better (however, compliance not very good)    Additional comments and observations from review of the patient s chart including the following:    See above    Additional comments and observations from review of the patient s chart including the following:    See details in notes    ROS: Patient generally feeling well today  Physical Examination:  General: Well-appearing, appropriately-developed individual.  Skin: examination of the trunk, face and extremities within the consultation was performed.   - on the right upper arm still slight erythema and dry skin  - dry skin with slight erythema also on left arm and legs. Trunk basically without lesions and face with some erythema, but no eczema  - in photo from April 2nd shows eczematous lesions with scratch marks.    Previous History of Present Illness:  04/07/2020 virtual consultation:  Middle December 2019 first on head itching and then over the strap of the CEPAP sleep apnoe mask and then over neck and later over the shoulders. First week of February in went to Dermatology --> diagnosed Folliculitis and got antibiotic and topicals. Since then got urticarial lesions all over  the upper body.   Patient travelled much for work and beginning of February went to [place].   Patient had a more severe reaction with lip swelling and breathing problems on 4th February woke up with Urticaria and around 10:30 patient was short of breath (not really angioedema).  Flew then the same evening from [place] to [place]. On the morning of the 5th of February bottom lip all puffy and swollen, not really short of breath. In ER with antihistamines itch went away and steroid injection.  After 6-7 days Urticaria came back and then went back to [facility]. They took there a biopsy and there again steroid injection.     Usually the lesions appear after sweating and they can itch or burn. After 3-4hours the lesions are still there.      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   - history of hay fever and asthma as a child    Current medications:   Tizanidine muscle relaxants since 7 years (Zanaflex) --> stopped 3-4 days and no changes  Oxycodone every day  Metaxolone muscle relaxants  Montelukast from Dr. Yi  Famotidine from Dr. Yi  Vitamine D  Cetirizine 10mg 2xdaily  Diphenhydramine (Benadryl)    No blood pressure medications, no diabetes  Tries to avoid NSAIDs (sometimes Ibuprofen 800mg --> 1-2 times per month)      Allergy History:     Allergies   Allergen Reactions     Alpha-Gal Fatigue, Headache, Hives, Rash and  Shortness Of Breath     Bee Pollen Anaphylaxis     Iodine Anaphylaxis     Contrast Dye      Fentanyl Hives     Iodine-131      --> as child pollen allergies. Still today in fall with ragweed rhinoconjunctivitis  --> as a child Asthma and had Immunotherapy    Social History:  The patient works as a . Patient has the following hobbies or non-occupational exposure     reports that he has quit smoking. He does not have any smokeless tobacco history on file.      Family History:  No family history on file.    Medications:  Current Outpatient Medications   Medication Sig Dispense Refill     cetirizine (ZYRTEC) 10 MG tablet Take 40-50 mg by mouth       diphenhydrAMINE (BENADRYL) 25 MG tablet Take 25-50 mg by mouth       doxepin (SINEQUAN) 25 MG capsule Take 1 capsule (25 mg) by mouth At Bedtime 60 capsule 0     doxepin (SINEQUAN) 25 MG capsule Take 1 capsule (25 mg) by mouth At Bedtime 30 capsule 1     metaxalone (SKELAXIN) 800 MG tablet 800 mg       Misc. Devices (CERVICAL TRACTION) KIT Over the Door Cervical Traction.  May be obtained through [employer] [facility] [employer] or ordered from the internet.  Traction weight from 8 to 15 pounds.  Not to exceed more than 15 pounds of traction weight.  Begin 10 minutes of traction daily and increase to 15 minutes, two to three times every day. 1 kit 0     montelukast (SINGULAIR) 10 MG tablet Take 1 tablet (10 mg) by mouth At Bedtime 60 tablet 0     montelukast (SINGULAIR) 10 MG tablet Take 10 mg by mouth every evening       oxyCODONE IR (ROXICODONE) 10 MG tablet Take 10 mg by mouth daily       predniSONE (DELTASONE) 50 MG tablet Emergency set: 2 tablet 3     tiZANidine (ZANAFLEX) 4 MG tablet Take 4 mg by mouth At Bedtime       vitamin D2 (ERGOCALCIFEROL) 47998 units (1250 mcg) capsule Take 50,000 Units by mouth         Allergy Tests:    Past Allergy Test: in the 60s = ragweed, dust mites, feathers    Future Allergy Test:   >> plan in future atopy screen prick test  and maybe oral provocation test with red meat    Order for PATCH TESTS    [x] Outpatient  [] Inpatient: [facility]..../ Bed ....      Skin Atopy (atopic dermatitis) [x] Yes   [] No since Dec 2019  Rhinitis/Sinusitis:   [x] Yes   [] No as child with positive skin tests and IT as child  Allergic Asthma:   [x] Yes   [] No ? As child  Food Allergy:   [x] Yes   [] No  Leg ulcers:   [] Yes   [] No  Hand eczema:   [] Yes   [] No   Leading hand:   [] R   [] L       [] Ambidextrous                        Reason for tests (suspected allergy): recurrent dermatitis on extremities DDx atopic dermatitis and/or allergic contact dermatitis  Known previous allergies: none    Standardized panels  [x] Standard panel (40 tests)  [x] Preservatives & Antimicrobials (31 tests)  [x] Emulsifiers & Additives (25 tests)   [x] Perfumes/Flavours & Plants (25 tests) wife uses essential oil diffuser  [] Hairdresser panel (12 tests)  [x] Rubber Chemicals (22 tests)  [] Plastics (26 tests)  [] Colorants/Dyes/Food additives (20 tests)  [] Metals (implants/dental) (24 tests)  [] Local anaesthetics/NSAIDs (14 tests)  [] Antibiotics & Antimycotics (14 tests)   [x] Corticosteroids (15 tests)   [] Photopatch test (62 tests)   [] others: ...      [x] Patient's own products: Benadryl cream (make it worse), Calamine lotion   and whichhazel    DO NOT test if chemical or biological identity is unknown!     always ask from patient the product information and safety sheets (MSDS)   [x] Atopy screen prick test (Atopic predisposition): if possible IDT molds and HDM    [] Patient needs consultation with Allergy team (changes of tests may apply)  [] Tests discussed with Allergy team (can have direct appointment for patch tests)      Impression/Plan:    >> Pruritic, eczematous rash on face, trunk and extremities DDx atopic dermatitis or allergic contact dermatitis (Benadryl cream)    Prick test and intradermal testing today    After above testing, will re-start  antihistamines and montelukast    >> 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     Alpha Gal antibodies positives    Maybe combination of red meat and NSAIDs (Ibuprofen can lead to the angioedemas)    Emergency set: Prednisone 100mg and 360mg Fexofenadine  --> avoid eating red meat and particularly taking NSAIDs at the same time    >> Patient had anaphylactic reaction with generalized Urticaria and breathing problems to bee and wasp      Patient counseling:  - Montelukast continue 10mg evening  - Doxepin 25mg every evening  - Fexofenadine 180mg every morning   --> avoid red meat  --> if possible do not take Ibuprofen Tabl (can take instead Tylenol if necessary), but write down when taken    >> take care of the atopic skin:    Vanicream cream 2x daily entire body    Free&Clear Shampoo and soap    Mometasone ointment on the       Blood tests to be tested (involved nurse):  Total IgE, IgE bee, IgE wasp, Tryptase, CBC, HbA1c      Follow-up: in Derm-Allergy clinic 6 weeks for patch testing      Thank you for the opportunity be involved in the care of this patient.     Staff:    Keesha Garces & PENNY Jacinto MD, PhD  Med-Derm PGY-5      I spent a total of *** minutes face to face with Jaspreet Ridley during today s office visit. Over 50% of this time was spent discussing all the individual test results, correlating them to the clinical relevance, counseling the patient and/or coordinating care. Please see Assessment and Plan for details.  This excludes any time spent performing ____________ (ex:  bx, applying patch tests, cryo therapy)

## 2020-11-05 ENCOUNTER — TELEPHONE (OUTPATIENT)
Dept: ALLERGY | Facility: CLINIC | Age: 65
End: 2020-11-05

## 2020-11-05 NOTE — TELEPHONE ENCOUNTER
Prior Authorization Approval    Authorization Effective Date: 8/7/2020  Authorization Expiration Date: 11/5/2021  Medication: tacrolimus (PROTOPIC) 0.1 % external ointment-PA APPROVED   Approved Dose/Quantity:   Reference #:     Insurance Company: [employer] [place] - Phone [phone] Fax [phone]  Expected CoPay:       CoPay Card Available:      Foundation Assistance Needed:    Which Pharmacy is filling the prescription (Not needed for infusion/clinic administered): [id number] - [place], [place] - [address] AT [facility] [address] [facility]  Pharmacy Notified: Yes- **Instructed pharmacy to notify patient when script is ready to /ship.**  Patient Notified: Yes

## 2020-11-05 NOTE — TELEPHONE ENCOUNTER
Central Prior Authorization Team   [phone]    PA Initiation    Medication: tacrolimus (PROTOPIC) 0.1 % external ointment  Insurance Company: [facility] - Phone [phone] Fax [phone]  Pharmacy Filling the Rx: [id number] - [place], [place] - [address] AT [facility] OF [facility] [address]  Filling Pharmacy Phone: [phone]  Filling Pharmacy Fax: [phone]  Start Date: 11/5/2020

## 2020-11-05 NOTE — TELEPHONE ENCOUNTER
Prior Authorization Retail Medication Request    Medication/Dose: Tacrolimus 0.1%  ICD code (if different than what is on RX):    Previously Tried and Failed:    Rationale:      Insurance Name:    Insurance ID:        Pharmacy Information (if different than what is on RX)  Name:  [facility]   Phone:  [phone]    Steven [id number]

## 2020-12-08 NOTE — PROGRESS NOTES
Subjective:  HPI  Physical Exam                    Objective:  System    Physical Exam    General     ROS    Assessment/Plan:    DISCHARGE REPORT    Progress reporting period is from 09/14/2020 to 12/08/2020.   Patient has not returned to therapy so current subjective and objective findings are unknown.  The subjective and objective information are from the last visit (10/07/2020) with this patient.    SUBJECTIVE  Subjective: fatigue at the end of the day isn't as bad as it used to be   Current Pain level: 2/10     OBJECTIVE  Objective: Able to progress DNF strengthening with lift performed without accessory recruitment. Proprioception assessment: left rotation WNL riight >8cm from target      ASSESSMENT/PLAN  Updated problem list and treatment plan: Diagnosis 1:  Neck pain -- home program  STG/LTGs have been met or progress has been made towards goals:  N/A  Assessment of Progress: The patient has not returned to therapy. Current status is unknown.  Self Management Plans:  Patient has been instructed in a home treatment program.  Jaspreet continues to require the following intervention to meet STG and LTG's: PT intervention is no longer required to meet STG/LTG.    Recommendations:  Pt last seen in PT 10/07/2020.  He has since cancelled appts without rescheduling.  Discharge to [facility].

## 2021-01-10 ENCOUNTER — HEALTH MAINTENANCE LETTER (OUTPATIENT)
Age: 66
End: 2021-01-10

## 2021-01-10 NOTE — PROGRESS NOTES
Note for return visit for Dermato-Allergy       Encounter Date: Jan 11, 2021    History of Present Illness:  Jaspreet Ridley is a 65 year old male who presents in person to the consult following information has been take directly from the prior notes, patients informations, Epic and/or other sources and exam/history performed by myself:     Patient stopped using many different products and stopped using perfumes. Had in December on upper right arm for few days acute eczematous lesions and similar lesions on the chest area and disappeared after 3-4 days using topical corticosteroids      Additional comments and observations from review of the patient s chart including the following:    See notes for more details    ROS: Patient generally feeling well today   Physical Examination:  General: Well-appearing, appropriately-developed individual.  - Skin: Focused examination of the skin on test sites on back within the consultation was performed.   On back no active lesions  In photo from lesions in December typical acute eczema  As above in HPI. No additional skin concerns.  - upper respiratory tract: currently no obvious Rhinitis  - lungs: no signs for active and present Asthma/Wheezing or coughing  - eyes: currently no active conjunctivits  - GI system/digestion: currently no problems, no bloating or Diarrhoea      Earlier History and Allergy exams:    Patient previously seen for itching and rash. Was to follow up in August 2020 for Prick/Patch testing, but his rash had resolved and so he cancelled the testing. He says that now it has returned again within the last 10 days. He is here today to try and figure out what is going on.     Eats red meat (not major quantities) and no problem    Had a flare up on 06/26 a flare up of a dermatitis on the right upper and lower arm and the lesions seem to appear always on same location and seem to stay longer than 24h and skin stays dry (now today still slight erythema and  dryness)    Monday morning after mothers day patient had a reaction (woke up). Went to sleep on Sunday evening (went to sleep around 11pm and woke up about 2h later with sweating and had chest and neck again burning and itching sensations and next morning some on the arm pits.   Had this Sunday 2 eggs for breakfast and bread for brunch and dinner around 7/8pm and ate Chicken Fajitas. No alcohol. No additional pain medications except daily Oxycodon for chronic neck pain.   --> patient did not eat red meat on this day  --> however, patient did not do the treatment with Doxepin or Singulair and took only Allegra in the morning (Compliance?).    Patient has almost every 2nd evening these sweatings and itching attacks and therefore not better (however, compliance not very good)    Previous History of Present Illness:  04/07/2020 virtual consultation:  Middle December 2019 first on head itching and then over the strap of the CEPAP sleep apnoe mask and then over neck and later over the shoulders. First week of February in went to Dermatology --> diagnosed Folliculitis and got antibiotic and topicals. Since then got urticarial lesions all over the upper body.   Patient travelled much for work and beginning of February went to [place].   Patient had a more severe reaction with lip swelling and breathing problems on 4th February woke up with Urticaria and around 10:30 patient was short of breath (not really angioedema).  Flew then the same evening from [place] to [place]. On the morning of the 5th of February bottom lip all puffy and swollen, not really short of breath. In ER with antihistamines itch went away and steroid injection.  After 6-7 days Urticaria came back and then went back to [facility]. They took there a biopsy and there again steroid injection.     Usually the lesions appear after sweating and they can itch or burn. After 3-4hours the lesions are still there.      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   - history of hay fever and asthma as a child    Current medications:   Tizanidine muscle relaxants since 7 years (Zanaflex) --> stopped 3-4 days and no changes  Oxycodone every day  Metaxolone muscle relaxants  Montelukast from Dr. Yi  Famotidine from Dr. Yi  Vitamine D  Cetirizine 10mg 2xdaily  Diphenhydramine (Benadryl)    No blood pressure medications, no diabetes  Tries to avoid NSAIDs (sometimes Ibuprofen 800mg --> 1-2 times per month)      Allergy History:     Allergies   Allergen Reactions     Alpha-Gal Fatigue, Headache, Hives, Rash and Shortness Of Breath     Bee Pollen Anaphylaxis     Iodine Anaphylaxis     Contrast Dye      Fentanyl Hives     Iodine-131      --> as child pollen allergies. Still today in fall with ragweed rhinoconjunctivitis  --> as a child Asthma and had Immunotherapy    Social History:  The patient works as a . Patient has the following hobbies or non-occupational exposure     reports that he has quit smoking. He does not have any smokeless tobacco history on file.      Family History:  No family history on file.    Medications:  Current Outpatient Medications   Medication Sig Dispense Refill     cetirizine (ZYRTEC) 10 MG tablet Take 40-50 mg by mouth       diphenhydrAMINE (BENADRYL) 25 MG tablet Take 25-50 mg by mouth       doxepin (SINEQUAN) 25 MG capsule  Take 1 capsule (25 mg) by mouth At Bedtime 60 capsule 0     doxepin (SINEQUAN) 25 MG capsule Take 1 capsule (25 mg) by mouth At Bedtime 60 capsule 0     doxepin (SINEQUAN) 25 MG capsule Take 1 capsule (25 mg) by mouth At Bedtime 30 capsule 1     emollient (VANICREAM) external cream Apply topically 2 times daily Free &Clear soap and shampoo 453 g 4     metaxalone (SKELAXIN) 800 MG tablet 800 mg       Misc. Devices (CERVICAL TRACTION) KIT Over the Door Cervical Traction.  May be obtained through [employer] or ordered from the internet.  Traction weight from 8 to 15 pounds.  Not to exceed more than 15 pounds of traction weight.  Begin 10 minutes of traction daily and increase to 15 minutes, two to three times every day. 1 kit 0     mometasone (ELOCON) 0.1 % external ointment On Saturday and Sunday on lesions 45 g 3     montelukast (SINGULAIR) 10 MG tablet Take 1 tablet (10 mg) by mouth At Bedtime 60 tablet 0     montelukast (SINGULAIR) 10 MG tablet Take 10 mg by mouth every evening       oxyCODONE IR (ROXICODONE) 10 MG tablet Take 10 mg by mouth daily       predniSONE (DELTASONE) 50 MG tablet Emergency set: 2 tablet 3     tiZANidine (ZANAFLEX) 4 MG tablet Take 4 mg by mouth At Bedtime       vitamin D2 (ERGOCALCIFEROL) 22582 units (1250 mcg) capsule Take 50,000 Units by mouth         Allergy Tests:    Past Allergy Test: in the 60s = ragweed, dust mites, feathers    Future Allergy Test:   >> plan in future atopy screen prick test and maybe oral provocation test with red meat    Order for PATCH TESTS    [x] Outpatient  [] Inpatient: [facility]..../ Bed ....      Skin Atopy (atopic dermatitis) [x] Yes   [] No since Dec 2019  Rhinitis/Sinusitis:   [x] Yes   [] No as child with positive skin tests and IT as child  Allergic Asthma:   [x] Yes   [] No ? As child  Food Allergy:   [x] Yes   [] No  Leg ulcers:   [] Yes   [] No  Hand eczema:   [] Yes   [] No   Leading hand:   [] R   [] L       [] Ambidextrous                         Reason for tests (suspected allergy): recurrent dermatitis on extremities DDx atopic dermatitis and/or allergic contact dermatitis  Known previous allergies: none    Standardized panels  [x] Standard panel (40 tests)  [x] Preservatives & Antimicrobials (31 tests)  [x] Emulsifiers & Additives (25 tests)   [x] Perfumes/Flavours & Plants (25 tests) wife uses essential oil diffuser  [] Hairdresser panel (12 tests)  [x] Rubber Chemicals (22 tests)  [] Plastics (26 tests)  [] Colorants/Dyes/Food additives (20 tests)  [] Metals (implants/dental) (24 tests)  [] Local anaesthetics/NSAIDs (14 tests)  [] Antibiotics & Antimycotics (14 tests)   [x] Corticosteroids (15 tests)   [] Photopatch test (62 tests)   [] others: ...      [x] Patient's own products: Benadryl cream (make it worse), Calamine    lotion and wichhazel = to do on Friday    DO NOT test if chemical or biological identity is unknown!     always ask from patient the product information and safety sheets (MSDS)   [x] Atopy screen prick test (Atopic predisposition): if possible IDT molds and HDM    RESULTS & EVALUATION of PATCH TESTS    Patch test readings after     [x] 2 days, [] 3 days [x] 4 days, [] 5 days,    Applied patch tests with results (import here the list of patch tests):    STANDARD Series      No Substance 2 days 4 days remarks   1 Neil Mix [C] - -    2 Colophony - -    3  2-Mercaptobenzothiazole  - -     4 Methylisothiazolinone - -    5 Carba Mix - -    6 Thiuram Mix [A] - -    7 Bisphenol A Epoxy Resin - -    8 Z-Jodh-Bcehbochzif-Formaldehyde Resin - -    9 Mercapto Mix [A] - -    10 Black Rubber Mix- PPD [B] - -    11 Potassium Dichromate  -  -    12 Balsam of [place] (Myroxylon Pereirae Resin) - -    13 Nickel Sulphate Hexahydrate - -    14 Mixed Dialkyl Thiourea - -    15 Paraben Mix [B] - -    16 Methyldibromo Glutaronitrile - -    17 Fragrance Mix - -    18 2-Bromo-2-Nitropropane-1,3-Diol (Bronopol) - -    19 Lyral - -    20  Tixocortol-21- Pivalate - -    21 Diazolidiyl Urea (Germall II) - -    22 Methyl Methacrylate - -    23 Cobalt (II) Chloride Hexahydrate - -    24 Fragrance Mix II  - -    25 Compositae Mix - -    26 Benzoyl Peroxide - -    27 Bacitracin - -    28 Formaldehyde - -    29 Methylchloroisothiazolinone / Methylisothiazolinone - -    30 Corticosteroid Mix - -    31 Sodium Lauryl Sulfate - -    32 Lanolin Alcohol - -    33 Turpentine - -    34 Cetylstearylalcohol - -    35 Chlorhexidine Dicluconate - -    36 Budenoside - -    37 Imidazolidinyl Urea  - -    38 Ethyl-2 Cyanoacrylate - -    39 Quaternium 15 (Dowicil 200) - -    40 Decyl Glucoside - -      CORTICOSTEROIDS   No Substance 2 days 4 days remarks Allergy  class   41 1 Amcinonide - -  B   42 2 Betametasone-17,21 Dipropionate - -  D1   43 3 Desoximetasone - -  C   44 4 Betamethasone-17-Valerate - -  D1   45 5 Dexamethasone - -  C   46 6 Hydrocortisone - -  A   47 7 Clobetasol-17-Propionate - -  D1   48 8 Dexamethasone-21-Phosphate Disodium Salt - -  C   49 9 Hydrocortisone-17 Butyrate - -  D2   50 10 Prednisolone - -  A   51 11 Mometason Furoate - -  D1   52 12 Triamcinolone Acetonide - -  B   53 13 Methylprednisolone Aceponate - -  D2   54 14 Hydrocortisone-21-Acetate - -  A   55 15 Prednicarbate - -  D2     Group Characteristics of group Generic name Name  cross reactions   A Hydrocortisone   Cloprednole, Fludrocortisone acétate, Hydrocortison acetate, Methylprednisolone, Prednisolone, Tixocortolpivalate Alfacortone, Fucidin H, Dermacalm, Hexacortone, Premandole, Imacort With group D2   B Triamcinolone-acetonide   Budenoside (R-isomer), Amcinonide, Desonide, Fluocinolone acetonide, Triamcinolone acetonide Locapred, Locatop  Synalar, Pevisone, Kenacort -   C Betamethasone (Without Lashon)   Betamethasone, Dexamethasone, Flumethasone pivalate, Halomethasone Daivobet, Dexasalyl, Locasalen,   -   D1 Betamethasone-diproprionate   Betamethasone dipropionate,  Betamethasone-17-valerate, Clobetasole-propionate, Fluticasone propionate, Mometasone furoate Betnovate, Diprogenta, Diprosalic, Diprosone, Celestoderm, Fucicort,  Cutivate, Axotide, Elocom -   D2 Methylprednisolone-aceponate   Hydrocortisone-aceponate, Hydrocortisone-buteprate, Hydrocortisone-17-butyrate, Methylprednisolone aceponate, Prednicarbate Locoïd, Advantan,  Prednitop With group A and Budesonide (S-isomer)     EMULSIFIERS & ADDITIVES   No Substance 2 days 4 days remarks   56 1 Polyethylene Glycol-400 - -    57 2 Cocamidopropyl Betaine - -    58 3 Amerchol L101 - -    59 4 Propylene Glycol - -    60 5 Triethanolamine - -    61 6 Sorbitane Sesquiolate - -    62 7 Isopropylmyristate - -    63 8 Polysorbate 80  - -    64 9 Amidoamine   (Stearamidopropyl Dimethylamine) - -    65 10 Oleamidopropyl Dimethylamine - -    66 11 Lauryl Glucoside - -    67 12 Coconut Diethanolamide  - -    68 13 2-Hydroxy-4-Methoxy Benzophenone (Oxybenzone) - -    69 14 Benzophenone-4 (Sulisobenzon) - -    70 15 Propolis - -    71 16 Dexpanthenol - -    72 17 Carboxymethyl Cellulose Sodium - -    73 18 Abitol - -    74 19 Tert-Butylhydroquinone - -    75 20 Benzyl Salicylate - -      Antioxidant      76 21 Dodecyl Gallate - -    77 22 Butylhydroxyanisole (BHA) - -    78 23 Butylhydroxytoluene (BHT) - -    79 24 Di-Alpha-Tocopherol (Vit E) - -    80 25 Propyl Gallate - -    81 26 Zinc Pyrithione      82 27 Dimethylaminopropylamin (DMAPA)        PRESERVATIVES & ANTIMICROBIALS     No Substance 2 days 4 days remarks   83 1  1,2-Benzisothiazoline-3-One, Sodium Salt - -    84 2  1,3,5-Ross (2-Hydroxyethyl) - Hexahydrotriazine (Grotan BK) - -    85 3 9-Ityuvkigifjvk-0-Nitro-1, 3-Propanediol - -    86 4  3, 4, 4' - Triclocarban - -    87 5 4 - Chloro - 3 - Cresol - -    88 6 4 - Chloro - 4 - Xylenol (PCMX) - -    89 7 7-Ethylbicyclooxazolidine ([facility] [id number]) - -    90 8 Benzalkonium Chloride - -    91 9 Benzyl Alcohol - -    92 10  Cetalkonium Chloride - -    93 11 Cetylpyrimidine Chloride  - -    94 12 Chloroacetamide - -    95 13 DMDM Hydantoin - -    96 14 Glutaraldehyde - -    97 15 Triclosan - -    98 16 Glyoxal Trimeric Dihydrate - -    99 17 Iodopropynyl Butylcarbamate - -    100 18 Octylisothiazoline - -    101 19 Iodoform - -    102 20 (Nitrobutyl) Morpholine/(Ethylnitro-Trimethylene) Dimorpholine (Bioban P 1487) - -    103 21 Phenoxyethanol - -    104 22 Phenyl Salicylate - -    105 23 Povidone Iodine - -    106 24 Sodium Benzoate - -    107 25 Sodium Disulfite - -    108 26 Sorbic Acid - -    109 27 Thimerosal - -    110 28 Melamine Formeladyde Resin      111 29 Ethylenediamine Dihydrochloride        Parabens      112 30 Butyl-P-Hydroxybenzoate - -    113 31 Ethyl-P-Hydroxybenzoate - -    114 32 Methyl-P-Hydroxybenzoate - -    115 33 Propyl-P-Hydroxybenzoate - -      PERFUMES, FLAVORS & PLANTS      No Substance 2 days 4 days remarks   116 1 Benzyl Cinnamate - -    117 2 Di-Limonene (Dipentene) - -    118 3 Cananga Odorata (Jacqueline Phillips) (I) - -    119 4 Lichen Acid Mix - -    120 5 Mentha Piperita Oil (Peppermint Oil) - -    121 6 Sesquiterpenelactone mix - -    122 7 Tea Tree Oil, Oxidized - -    123 8 Wood Tar Mix - -    124 9 Abietic Acid - -    125 10 Lavendula Angustifolia Oil (Lavender Oil) - -    126 11 Camphor  - -      Fragrance Mix I      127 12 [place]moss Absolute - -    128 13 Eugenol - -    129 14 Geraniol - -    130 15 Hydroxycitronellal - -    131 16 Isoeugenol - -    132 17 Cinnamic Aldehyde - -    133 18 Cinnamic Alcohol  - -      Fragrance mix II      134 19 Citronellol - -    135 20 Alpha-Hexylcinnamic Aldehyde    - -    136 21 Citral - -    137 22 Farnesol - -    18 23 Coumarin - -      RUBBER CHEMICALS     No Substance 2 days 4 days remarks   139  Carbamate      140 1 Zinc Bis ( Diethyldithio carbamate ) (ZDEC) - -    141 2 Zinc Bis (Dibutyldithiocarbamate) - -    142 3 1,3-Diphenylguanidine (DPG) - -      Thiourea       143 4 Dibutylthiourea - -    144 5 Diphenyltiourea - -    145 6 Thiourea - -      Mercapto chemicals      146 7 Morpholinyl Mercaptobenzothiazole - -    147 8 F-Suzsyzsofr-6-Benzothiazyl-Sulfenamide - -    148 9 Dibenzothiazyl Disulfide - -      Thiuram chemicals      149 10 Dipentamethylenethiuram Disulfide - -    150 11 Tetraethylthiuram Disulfide (Disulfiram) - -    151 12 Tetramethylthiuram Disulfide - -    152 13 Tetramethyl Thiuram Monosulfide (TMTM) - -      4-Phenylenediamine derivatives      153 14 N-Isopropyl-N'-Phenyl-P-Phenylenediamine (IPPD) - -    154 15 Qpbovlwa-N-Utrijjekbvlabqon (DPPD) - -      Various Rubber Additives      155 16 Hydroquinone Monobenzylether  - -    156 17 Hexamethylenetetramine - -    157 18 4,4'-Dihydroxybiphenyl - -    158 19 Cyclohexylthiophtalimide - -    159 20 N-Phenyl-B-Naphthylamine - -    160 21 Dodecyl Mercaptan - -      OTHER PRODUCTS      No Substance Conc  % solv 2 days 4 days remarks   161 1 Benadryl Cream        162 2 Calamine        163 3 Whichhazel        164 4         165 5         166 6         167 7         168 8         169 9         170 10           Results of patch tests:                         Interpretation:    - Negative                    A    = Allergic      (+) Erythema    TI   = Toxic/irritant   + E + Infiltration    RaP = Relevance at Present     ++ E/I + Papulovesicle   Rpr  = Relevance Previously     +++ E/I/P + Blister     nR   = No Relevance        Atopy Screen (Placed 11/03/20 )    No Substance Readings (15 min) Evaluation   POS Histamine 1mg/ml ++    NEG NaCl 0.9% -      No Substance Readings (15 min) Evaluation   1 Alternaria alternata (tenuis)  ++    2 Cladosporium herbarum -    3 Aspergillus fumigatus -    4 Penicillium notatum -    5 Dermatophagoides pteronyssinus -    6 Dermatophagoides farinae -    7 Dog epithelium (canis spp) -    8 Cat hair (marco catus) -    9 Cockroach   (Blatella americana & germanica) -    10 Grass mix [place]    (June, Orchard, Redtop, Francisco Javier) -    11 Jj grass (sorghum halepense) -    12 Weed mix   (common Cocklebur, Lamb s quarters, rough redroot Pigweed, Dock/Sorrel) +    13 Mug wort (artemisia vulgare) +    14 Ragweed giant/short (ambrosia spp) -    15 English Plantain (plantago lanceolata) -    16 Tree mix 1 (Pecan, Maple BHR, Oak RVW, american [place], black [place]) +    17 Red cedar (juniperus virginia) +    18 Tree mix 2   (white Frederic, river/red Birch, black [place], common [place], american Elm) -    19 Box elder/Maple mix (acer spp) -    20 Vining shagbark (carya ovata) -       -      Conclusion: patient is atopic with sensitization to Alternaria mold and some pollens (weed/tree)      Intradermal Testing (Placed 11/03/20 )    No Substance Conc.  Readings (15min) Evaluation   - NaCl  0.9% neg    + Histamine (prick) 0.1mg / ml -    DF Standard Dust Mite - D. Farinae 1:10 neg    DP Standard Dust Mite - D. Pteronyssinus 1:10 neg    A Aspergillus fumigatus  1:10 + 7mmP/E   P Penicillium notatum 1:10 neg 5mmP, no E     Conclusion: slight reaction in intradermal test to Aspergillus, but not to dust mites after 15min. After 1 day all reactions gone    [] No relevant allergic reaction observed    [] Allergic reaction diagnosed against following allergens:      Interpretation/ remarks:   See later    [] Patient information given   [] ACDS CAMP information's (# ....) to following compounds: .....   [] General information's to following compounds: ......      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 with total IgE  not elevated)    Maybe combination of red meat and NSAIDs (Ibuprofen can lead to the angioedemas)    Emergency set: Prednisone 100mg and 360mg Fexofenadine  --> avoid eating red meat and particularly taking NSAIDs at the same time    >> Patient had anaphylactic reaction with generalized Urticaria and breathing problems to bee and wasp    Patient has specific IgE to wasp (0.99), but NOT to bee (neg)    These conclusions are made at the best of one's knowledge and belief based on the provided evidence such as patient's history and allergy test results and they can change over time or can be incomplete because of missing information's.    ==> Treatment prescribed/Plan:    - Doxepin 25mg every evening  - Fexofenadine 180mg every morning   --> avoid red meat  --> if possible do not take Ibuprofen Tabl (can take instead Tylenol if necessary), but write down when taken    >> take care of the atopic skin:    Vanicream cream 2x daily entire body    Free&Clear Shampoo and soap    Mometasone ointment on Saturday and Sunday on the lesions    Protopic ointment 0.1% from Monday to Friday on lesions (was too expensive, never used)    --> if rash gets worse, then consider use of Dupixent s.c.  --> patient will make photo and feed back to us if any delayed reaction to intradermal tests      Blood tests to be tested (involved nurse):  Total IgE, IgE bee, IgE wasp, Tryptase, CBC, HbA1c    Follow-up: in Derm-Allergy clinic for 1st readings of patch tests after 2 days (virtual) and 2nd readings and final conclusions after 4 days (in person)      Thank you for the opportunity be involved in the care of this patient.     Staff: Keesha Garces LPN and Donna Bose RN    I spent a total of 27 minutes face to face with Jaspreet Ridley during today s office visit. Over 50% of this time was spent discussing all the individual test results, correlating them to the clinical relevance, counseling the patient and/or coordinating care.  Please see Assessment and Plan for details.  This excludes any time spent performing patch tests

## 2021-01-11 ENCOUNTER — OFFICE VISIT (OUTPATIENT)
Dept: ALLERGY | Facility: CLINIC | Age: 66
End: 2021-01-11
Payer: COMMERCIAL

## 2021-01-11 DIAGNOSIS — L50.0 ALLERGIC URTICARIA DUE TO INGESTED FOOD: ICD-10-CM

## 2021-01-11 DIAGNOSIS — L23.89 ALLERGIC CONTACT DERMATITIS DUE TO OTHER AGENTS: ICD-10-CM

## 2021-01-11 DIAGNOSIS — Z91.018 FOOD ALLERGY: Primary | ICD-10-CM

## 2021-01-11 DIAGNOSIS — H10.10 ALLERGIC RHINOCONJUNCTIVITIS: ICD-10-CM

## 2021-01-11 DIAGNOSIS — J30.9 ALLERGIC RHINOCONJUNCTIVITIS: ICD-10-CM

## 2021-01-11 DIAGNOSIS — Z91.018 FOOD ALLERGY: ICD-10-CM

## 2021-01-11 DIAGNOSIS — L20.89 OTHER ATOPIC DERMATITIS: ICD-10-CM

## 2021-01-11 PROCEDURE — 95044 PATCH/APPLICATION TESTS: CPT | Mod: 59 | Performed by: DERMATOLOGY

## 2021-01-11 RX ORDER — MOMETASONE FUROATE 1 MG/G
OINTMENT TOPICAL
Qty: 45 G | Refills: 3 | Status: SHIPPED | OUTPATIENT
Start: 2021-01-11

## 2021-01-11 ASSESSMENT — PAIN SCALES - GENERAL: PAINLEVEL: NO PAIN (0)

## 2021-01-11 NOTE — PATIENT INSTRUCTIONS
Patch Testing Information  What are allergen patch tests?    The test is done to look for skin allergies that may be causing rashes and irritation.    A patch test is a way of identifying whether a substance has caused a delayed reaction with skin inflammation, such as contact eczema or delayed (after days) reactions to drugs.     We will use various types of test compounds, which may include common allergens you may come in contact with in daily life such as preservatives, fragrances or even drugs.     Most of the time we will use standardized, prefabricated test solutions. The choice of the substances and series tested will depend on your history of reactions. Sometimes we will test your own products as well.     In order to avoid severe toxic reactions we need detailed information or safety sheets for each of the test compounds.    The test panels are set up with a small amount of common substances that cause skin allergies. They are taped to your skin with a clear hypoallergenic bandage and reinforced hypoallergenic tape.    The substances are numbered, so it is easy to tell what is causing a skin reaction.  What do I need to do in preparation for the test?    Stop all systemic steroids 1 month prior to the testing.     Stop applying topical steroids to the test area one week prior to the test. It is to use them elsewhere throughout the testing process. If this is not possible then discuss options with the Allergy specialist.    Do not go sunbathing or tanning for one week prior to testing    It is okay to take antihistamines as normal.     Please wear dark colors the week of the test since we will write on you with a dark marker that may transfer and stain clothing or bedding.     Some medications can affect the reactions to allergens during the tests. Therefore reveal all the medications you take to the allergy team. The doctor will discuss the medications with you before the tests.     Eat how you normally  would.    Avoid the following:    You cannot get the test area wet during the entire test period. This means no bathing or swimming the entire test period.    No strenuous exercise that may cause sweating.    Do not scratch the test area, this can cause the allergen to spread and give us false positives.     Avoid exposure to UV irradiation. This means no tanning or UV treatment during the testing period.   What can I expect?    Patch testing is done over three appointments.   o The usual schedule is: Monday (Allergen patches are placed), Wednesday (Patches are removed and skin is examined by the MD), and Friday (Skin is examined by the MD)      If you are allergic, there will be an area of irritation where the test was placed.    Itching or burning at the test site might happen if you are allergic to the allergen.  Do not rub or scratch the test site since this may spread the allergen and possibly cause false positives. If itching or burning is not tolerable please contact the clinic.    The marker we draw on your back with ma take up to 5 weeks to wash off completely. Rubbing alcohol can help speed up this process.     Reactions can occur 1 to 2 weeks after the tests are applied. If this happens please take photos of the area and contact the clinic.  What should I do after the tests are placed?    Keep the area dry. No showering or getting the test area wet from the time we see you at your first visit until after your third appointment. If you get the test area wet you are washing off the test and we could get false negative reactions.    If you notice any of the test patches coming loose put on more tape to re-secure the test.    If the marker that is applied fades you can use a dark pen to maury around the panel sites.    Cover the test area when you are outside to avoid any sun exposure while the test is in place.     You can remove the tests only if there is a severe reaction (itch, pain, blistering). Please  report this to your doctor immediately. If you have to remove the tests please maury the locations of each test field with a grid so we can identify the allergen.  WHAT ARE THE POSSIBLE RISKS OF PATCH TESTS     If you are allergic to a compound tested you will develop a localized skin reaction similar to your previous reaction, this may take days to develop. These reactions include a formation of red, itchy skin lesions that could be about a centimeter with small vesicles or possibly even blisters. The lesions will fade over time, this may take weeks. The test might leave some skin pigmentation for a few months.     In rare cases a localized reaction to patch testing can become generalized.     The tests with your own products might have some risks because they are not standardized and unanticipated reactions could occur. We need as much information as possible to evaluate if your own products are safe to test and under what conditions. This has to be evaluated for each individual product.   Useful Contact Information    To contact your doctor you can either send a [employer] [facility] message or call [phone]    Address  o [address] [place] [address], [address]    If you develop any serious or adverse allergic reaction after office hours please seek immediate medical assistance at the nearest clinic, urgent care, or emergency room.        Removal of Patch Tests on Day 3:    1. Remove patches and tape from one test area (one rectangle)          2. Using the purple surgical markers provided (or other permanent marker), draw a grid around the test area so that the circular indentation is in the center of each square, as below. Try to be as neat as possible and keep the lines as straight as you can (you can use a ruler if you need to)          3. Redraw the number that was underneath the tape above the grids, as shown below. Try to print clearly.          4. Repeat the process for the remaining test  areas.          5. Photograph the entire area then take close-up photos of any possible reactions. Examples of possible reactions are spots that look like these:          6. Return to clinic for evaluation as instructed. You should continue to avoid getting the area wet (no showering or strenuous exercise), exposing the area to UV light, using topical steroids, or scratching.         Who should I call with questions?    [facility], [place]: [phone]    For urgent needs outside of business hours call the [facility] at [phone] and ask for the dermatology resident on call      If you develop any serious or adverse allergic reaction after office hours please seek immediate medical assistance at the nearest clinic or emergency room

## 2021-01-11 NOTE — NURSING NOTE
Allergy Rooming Note    Jaspreet Ridley's goals for this visit include:   Chief Complaint   Patient presents with     Allergy Testing Followup     Jaspreet is here for patch testing day 1     Keesha Garces LPN

## 2021-01-11 NOTE — LETTER
1/11/2021         RE: Jaspreet Ridley  [address] [place] [address]        Dear Colleague,    Thank you for referring your patient, Jaspreet Ridley, to the [facility] [place]. Please see a copy of my visit note below.    Note for return visit for Dermato-Allergy       Encounter Date: Jan 11, 2021    History of Present Illness:  Jaspreet Ridley is a 65 year old male who presents in person to the consult following information has been take directly from the prior notes, patients informations, Epic and/or other sources and exam/history performed by myself:     Patient stopped using many different products and stopped using perfumes. Had in December on upper right arm for few days acute eczematous lesions and similar lesions on the chest area and disappeared after 3-4 days using topical corticosteroids      Additional comments and observations from review of the patient s chart including the following:    See notes for more details    ROS: Patient generally feeling well today   Physical Examination:  General: Well-appearing, appropriately-developed individual.  - Skin: Focused examination of the skin on test sites on back within the consultation was performed.   On back no active lesions  In photo from lesions in December typical acute eczema  As above in HPI. No additional skin concerns.  - upper respiratory tract: currently no obvious Rhinitis  - lungs: no signs for active and present Asthma/Wheezing or coughing  - eyes: currently no active conjunctivits  - GI system/digestion: currently no problems, no bloating or Diarrhoea      Earlier History and Allergy exams:    Patient previously seen for itching and rash. Was to follow up in August 2020 for Prick/Patch testing, but his rash had resolved and so he cancelled the testing. He says that now it has returned again within the last 10 days. He is here today to try and figure out what is going on.     Eats red meat (not major  quantities) and no problem    Had a flare up on 06/26 a flare up of a dermatitis on the right upper and lower arm and the lesions seem to appear always on same location and seem to stay longer than 24h and skin stays dry (now today still slight erythema and dryness)    Monday morning after mothers day patient had a reaction (woke up). Went to sleep on Sunday evening (went to sleep around 11pm and woke up about 2h later with sweating and had chest and neck again burning and itching sensations and next morning some on the arm pits.   Had this Sunday 2 eggs for breakfast and bread for brunch and dinner around 7/8pm and ate Chicken Fajitas. No alcohol. No additional pain medications except daily Oxycodon for chronic neck pain.   --> patient did not eat red meat on this day  --> however, patient did not do the treatment with Doxepin or Singulair and took only Allegra in the morning (Compliance?).    Patient has almost every 2nd evening these sweatings and itching attacks and therefore not better (however, compliance not very good)    Previous History of Present Illness:  04/07/2020 virtual consultation:  Middle December 2019 first on head itching and then over the strap of the CEPAP sleep apnoe mask and then over neck and later over the shoulders. First week of February in went to Dermatology --> diagnosed Folliculitis and got antibiotic and topicals. Since then got urticarial lesions all over the upper body.   Patient travelled much for work and beginning of February went to [place].   Patient had a more severe reaction with lip swelling and breathing problems on 4th February woke up with Urticaria and around 10:30 patient was short of breath (not really angioedema).  Flew then the same evening from [place] to [place]. On the morning of the 5th of February bottom lip all puffy and swollen, not really short of breath. In ER with antihistamines itch went away and steroid injection.  After 6-7 days Urticaria came back  and then went back to [facility]. They took there a biopsy and there again steroid injection.     Usually the lesions appear after sweating and they can itch or burn. After 3-4hours the lesions are still there.      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   - history of hay fever and asthma as a child    Current medications:   Tizanidine muscle relaxants since 7 years (Zanaflex) --> stopped 3-4 days and no changes  Oxycodone every day  Metaxolone muscle relaxants  Montelukast from Dr. Yi  Famotidine from Dr. Yi  Vitamine D  Cetirizine 10mg 2xdaily  Diphenhydramine (Benadryl)    No blood pressure medications, no diabetes  Tries to avoid NSAIDs (sometimes Ibuprofen 800mg --> 1-2 times per month)      Allergy History:     Allergies   Allergen Reactions     Alpha-Gal Fatigue, Headache, Hives, Rash and Shortness Of Breath     Bee Pollen Anaphylaxis     Iodine Anaphylaxis     Contrast Dye      Fentanyl Hives     Iodine-131      --> as child pollen allergies. Still today in fall with ragweed rhinoconjunctivitis  --> as a child Asthma and had Immunotherapy    Social History:  The patient works as a . Patient has the following hobbies or non-occupational exposure     reports that he has quit smoking. He does not have any smokeless tobacco history on file.      Family History:  No family history on  file.    Medications:  Current Outpatient Medications   Medication Sig Dispense Refill     cetirizine (ZYRTEC) 10 MG tablet Take 40-50 mg by mouth       diphenhydrAMINE (BENADRYL) 25 MG tablet Take 25-50 mg by mouth       doxepin (SINEQUAN) 25 MG capsule Take 1 capsule (25 mg) by mouth At Bedtime 60 capsule 0     doxepin (SINEQUAN) 25 MG capsule Take 1 capsule (25 mg) by mouth At Bedtime 60 capsule 0     doxepin (SINEQUAN) 25 MG capsule Take 1 capsule (25 mg) by mouth At Bedtime 30 capsule 1     emollient (VANICREAM) external cream Apply topically 2 times daily Free &Clear soap and shampoo 453 g 4     metaxalone (SKELAXIN) 800 MG tablet 800 mg       Misc. Devices (CERVICAL TRACTION) KIT Over the Door Cervical Traction.  May be obtained through [employer] or ordered from the internet.  Traction weight from 8 to 15 pounds.  Not to exceed more than 15 pounds of traction weight.  Begin 10 minutes of traction daily and increase to 15 minutes, two to three times every day. 1 kit 0     mometasone (ELOCON) 0.1 % external ointment On Saturday and Sunday on lesions 45 g 3     montelukast (SINGULAIR) 10 MG tablet Take 1 tablet (10 mg) by mouth At Bedtime 60 tablet 0     montelukast (SINGULAIR) 10 MG tablet Take 10 mg by mouth every evening       oxyCODONE IR (ROXICODONE) 10 MG tablet Take 10 mg by mouth daily       predniSONE (DELTASONE) 50 MG tablet Emergency set: 2 tablet 3     tiZANidine (ZANAFLEX) 4 MG tablet Take 4 mg by mouth At Bedtime       vitamin D2 (ERGOCALCIFEROL) 93354 units (1250 mcg) capsule Take 50,000 Units by mouth         Allergy Tests:    Past Allergy Test: in the 60s = ragweed, dust mites, feathers    Future Allergy Test:   >> plan in future atopy screen prick test and maybe oral provocation test with red meat    Order for PATCH TESTS    [x] Outpatient  [] Inpatient: [facility]..../ Bed ....      Skin Atopy (atopic dermatitis) [x] Yes   [] No since Dec 2019  Rhinitis/Sinusitis:   [x] Yes    [] No as child with positive skin tests and IT as child  Allergic Asthma:   [x] Yes   [] No ? As child  Food Allergy:   [x] Yes   [] No  Leg ulcers:   [] Yes   [] No  Hand eczema:   [] Yes   [] No   Leading hand:   [] R   [] L       [] Ambidextrous                        Reason for tests (suspected allergy): recurrent dermatitis on extremities DDx atopic dermatitis and/or allergic contact dermatitis  Known previous allergies: none    Standardized panels  [x] Standard panel (40 tests)  [x] Preservatives & Antimicrobials (31 tests)  [x] Emulsifiers & Additives (25 tests)   [x] Perfumes/Flavours & Plants (25 tests) wife uses essential oil diffuser  [] Hairdresser panel (12 tests)  [x] Rubber Chemicals (22 tests)  [] Plastics (26 tests)  [] Colorants/Dyes/Food additives (20 tests)  [] Metals (implants/dental) (24 tests)  [] Local anaesthetics/NSAIDs (14 tests)  [] Antibiotics & Antimycotics (14 tests)   [x] Corticosteroids (15 tests)   [] Photopatch test (62 tests)   [] others: ...      [x] Patient's own products: Benadryl cream (make it worse), Calamine    lotion and wichhazel = to do on Friday    DO NOT test if chemical or biological identity is unknown!     always ask from patient the product information and safety sheets (MSDS)   [x] Atopy screen prick test (Atopic predisposition): if possible IDT molds and HDM    RESULTS & EVALUATION of PATCH TESTS    Patch test readings after     [x] 2 days, [] 3 days [x] 4 days, [] 5 days,    Applied patch tests with results (import here the list of patch tests):    STANDARD Series      No Substance 2 days 4 days remarks   1 Neil Mix [C] - -    2 Colophony - -    3  2-Mercaptobenzothiazole  - -     4 Methylisothiazolinone - -    5 Carba Mix - -    6 Thiuram Mix [A] - -    7 Bisphenol A Epoxy Resin - -    8 P-Ggex-Uulgfplxyuc-Formaldehyde Resin - -    9 Mercapto Mix [A] - -    10 Black Rubber Mix- PPD [B] - -    11 Potassium Dichromate  -  -    12 Balsam of [place] (Myroxylon  Pereirae Resin) - -    13 Nickel Sulphate Hexahydrate - -    14 Mixed Dialkyl Thiourea - -    15 Paraben Mix [B] - -    16 Methyldibromo Glutaronitrile - -    17 Fragrance Mix - -    18 2-Bromo-2-Nitropropane-1,3-Diol (Bronopol) - -    19 Lyral - -    20 Tixocortol-21- Pivalate - -    21 Diazolidiyl Urea (Germall II) - -    22 Methyl Methacrylate - -    23 Cobalt (II) Chloride Hexahydrate - -    24 Fragrance Mix II  - -    25 Compositae Mix - -    26 Benzoyl Peroxide - -    27 Bacitracin - -    28 Formaldehyde - -    29 Methylchloroisothiazolinone / Methylisothiazolinone - -    30 Corticosteroid Mix - -    31 Sodium Lauryl Sulfate - -    32 Lanolin Alcohol - -    33 Turpentine - -    34 Cetylstearylalcohol - -    35 Chlorhexidine Dicluconate - -    36 Budenoside - -    37 Imidazolidinyl Urea  - -    38 Ethyl-2 Cyanoacrylate - -    39 Quaternium 15 (Dowicil 200) - -    40 Decyl Glucoside - -      CORTICOSTEROIDS   No Substance 2 days 4 days remarks Allergy  class   41 1 Amcinonide - -  B   42 2 Betametasone-17,21 Dipropionate - -  D1   43 3 Desoximetasone - -  C   44 4 Betamethasone-17-Valerate - -  D1   45 5 Dexamethasone - -  C   46 6 Hydrocortisone - -  A   47 7 Clobetasol-17-Propionate - -  D1   48 8 Dexamethasone-21-Phosphate Disodium Salt - -  C   49 9 Hydrocortisone-17 Butyrate - -  D2   50 10 Prednisolone - -  A   51 11 Mometason Furoate - -  D1   52 12 Triamcinolone Acetonide - -  B   53 13 Methylprednisolone Aceponate - -  D2   54 14 Hydrocortisone-21-Acetate - -  A   55 15 Prednicarbate - -  D2     Group Characteristics of group Generic name Name  cross reactions   A Hydrocortisone   Cloprednole, Fludrocortisone acétate, Hydrocortison acetate, Methylprednisolone, Prednisolone, Tixocortolpivalate Alfacortone, Fucidin H, Dermacalm, Hexacortone, Premandole, Imacort With group D2   B Triamcinolone-acetonide   Budenoside (R-isomer), Amcinonide, Desonide, Fluocinolone acetonide, Triamcinolone acetonide Locapred,  Locatop  Synalar, Pevisone, Kenacort -   C Betamethasone (Without Lashon)   Betamethasone, Dexamethasone, Flumethasone pivalate, Halomethasone Daivobet, Dexasalyl, Locasalen,   -   D1 Betamethasone-diproprionate   Betamethasone dipropionate, Betamethasone-17-valerate, Clobetasole-propionate, Fluticasone propionate, Mometasone furoate Betnovate, Diprogenta, Diprosalic, Diprosone, Celestoderm, Fucicort,  Cutivate, Axotide, Elocom -   D2 Methylprednisolone-aceponate   Hydrocortisone-aceponate, Hydrocortisone-buteprate, Hydrocortisone-17-butyrate, Methylprednisolone aceponate, Prednicarbate Locoïd, Advantan,  Prednitop With group A and Budesonide (S-isomer)     EMULSIFIERS & ADDITIVES   No Substance 2 days 4 days remarks   56 1 Polyethylene Glycol-400 - -    57 2 Cocamidopropyl Betaine - -    58 3 Amerchol L101 - -    59 4 Propylene Glycol - -    60 5 Triethanolamine - -    61 6 Sorbitane Sesquiolate - -    62 7 Isopropylmyristate - -    63 8 Polysorbate 80  - -    64 9 Amidoamine   (Stearamidopropyl Dimethylamine) - -    65 10 Oleamidopropyl Dimethylamine - -    66 11 Lauryl Glucoside - -    67 12 Coconut Diethanolamide  - -    68 13 2-Hydroxy-4-Methoxy Benzophenone (Oxybenzone) - -    69 14 Benzophenone-4 (Sulisobenzon) - -    70 15 Propolis - -    71 16 Dexpanthenol - -    72 17 Carboxymethyl Cellulose Sodium - -    73 18 Abitol - -    74 19 Tert-Butylhydroquinone - -    75 20 Benzyl Salicylate - -      Antioxidant      76 21 Dodecyl Gallate - -    77 22 Butylhydroxyanisole (BHA) - -    78 23 Butylhydroxytoluene (BHT) - -    79 24 Di-Alpha-Tocopherol (Vit E) - -    80 25 Propyl Gallate - -    81 26 Zinc Pyrithione      82 27 Dimethylaminopropylamin (DMAPA)        PRESERVATIVES & ANTIMICROBIALS     No Substance 2 days 4 days remarks   83 1  1,2-Benzisothiazoline-3-One, Sodium Salt - -    84 2  1,3,5-Ross (2-Hydroxyethyl) - Hexahydrotriazine (Kamar CARTER) - -    85 3 0-Terpxcmxgfphh-5-Nitro-1, 3-Propanediol - -    86 4   3, 4, 4' - Triclocarban - -    87 5 4 - Chloro - 3 - Cresol - -    88 6 4 - Chloro - 4 - Xylenol (PCMX) - -    89 7 7-Ethylbicyclooxazolidine (Bioban DU0480) - -    90 8 Benzalkonium Chloride - -    91 9 Benzyl Alcohol - -    92 10 Cetalkonium Chloride - -    93 11 Cetylpyrimidine Chloride  - -    94 12 Chloroacetamide - -    95 13 DMDM Hydantoin - -    96 14 Glutaraldehyde - -    97 15 Triclosan - -    98 16 Glyoxal Trimeric Dihydrate - -    99 17 Iodopropynyl Butylcarbamate - -    100 18 Octylisothiazoline - -    101 19 Iodoform - -    102 20 (Nitrobutyl) Morpholine/(Ethylnitro-Trimethylene) Dimorpholine (Bioban P 1487) - -    103 21 Phenoxyethanol - -    104 22 Phenyl Salicylate - -    105 23 Povidone Iodine - -    106 24 Sodium Benzoate - -    107 25 Sodium Disulfite - -    108 26 Sorbic Acid - -    109 27 Thimerosal - -    110 28 Melamine Formeladyde Resin      111 29 Ethylenediamine Dihydrochloride        Parabens      112 30 Butyl-P-Hydroxybenzoate - -    113 31 Ethyl-P-Hydroxybenzoate - -    114 32 Methyl-P-Hydroxybenzoate - -    115 33 Propyl-P-Hydroxybenzoate - -      PERFUMES, FLAVORS & PLANTS      No Substance 2 days 4 days remarks   116 1 Benzyl Cinnamate - -    117 2 Di-Limonene (Dipentene) - -    118 3 Cananga Odorata (Jacqueline Phillips) (I) - -    119 4 Lichen Acid Mix - -    120 5 Mentha Piperita Oil (Peppermint Oil) - -    121 6 Sesquiterpenelactone mix - -    122 7 Tea Tree Oil, Oxidized - -    123 8 Wood Tar Mix - -    124 9 Abietic Acid - -    125 10 Lavendula Angustifolia Oil (Lavender Oil) - -    126 11 Camphor  - -      Fragrance Mix I      127 12 Oakmoss Absolute - -    128 13 Eugenol - -    129 14 Geraniol - -    130 15 Hydroxycitronellal - -    131 16 Isoeugenol - -    132 17 Cinnamic Aldehyde - -    133 18 Cinnamic Alcohol  - -      Fragrance mix II      134 19 Citronellol - -    135 20 Alpha-Hexylcinnamic Aldehyde    - -    136 21 Citral - -    137 22 Farnesol - -    18 23 Coumarin - -       RUBBER CHEMICALS     No Substance 2 days 4 days remarks   139  Carbamate      140 1 Zinc Bis ( Diethyldithio carbamate ) (ZDEC) - -    141 2 Zinc Bis (Dibutyldithiocarbamate) - -    142 3 1,3-Diphenylguanidine (DPG) - -      Thiourea      143 4 Dibutylthiourea - -    144 5 Diphenyltiourea - -    145 6 Thiourea - -      Mercapto chemicals      146 7 Morpholinyl Mercaptobenzothiazole - -    147 8 S-Rhhippjwqp-3-Benzothiazyl-Sulfenamide - -    148 9 Dibenzothiazyl Disulfide - -      Thiuram chemicals      149 10 Dipentamethylenethiuram Disulfide - -    150 11 Tetraethylthiuram Disulfide (Disulfiram) - -    151 12 Tetramethylthiuram Disulfide - -    152 13 Tetramethyl Thiuram Monosulfide (TMTM) - -      4-Phenylenediamine derivatives      153 14 N-Isopropyl-N'-Phenyl-P-Phenylenediamine (IPPD) - -    154 15 Rrdumfdc-O-Jtmrqbkthrnufwwe (DPPD) - -      Various Rubber Additives      155 16 Hydroquinone Monobenzylether  - -    156 17 Hexamethylenetetramine - -    157 18 4,4'-Dihydroxybiphenyl - -    158 19 Cyclohexylthiophtalimide - -    159 20 N-Phenyl-B-Naphthylamine - -    160 21 Dodecyl Mercaptan - -      OTHER PRODUCTS      No Substance Conc  % solv 2 days 4 days remarks   161 1 Benadryl Cream        162 2 Calamine        163 3 Whichhazel        164 4         165 5         166 6         167 7         168 8         169 9         170 10           Results of patch tests:                         Interpretation:    - Negative                    A    = Allergic      (+) Erythema    TI   = Toxic/irritant   + E + Infiltration    RaP = Relevance at Present     ++ E/I + Papulovesicle   Rpr  = Relevance Previously     +++ E/I/P + Blister     nR   = No Relevance        Atopy Screen (Placed 11/03/20 )    No Substance Readings (15 min) Evaluation   POS Histamine 1mg/ml ++    NEG NaCl 0.9% -      No Substance Readings (15 min) Evaluation   1 Alternaria alternata (tenuis)  ++    2 Cladosporium herbarum -    3 Aspergillus fumigatus  -    4 Penicillium notatum -    5 Dermatophagoides pteronyssinus -    6 Dermatophagoides farinae -    7 Dog epithelium (canis spp) -    8 Cat hair (marco catus) -    9 Cockroach   (Blatella americana & germanica) -    10 Grass mix midwest   (Kellee, Orchard, Redtop, Francisco Javier) -    11 Jj grass (sorghum halepense) -    12 Weed mix   (common Cocklebur, Lamb s quarters, rough redroot Pigweed, Dock/Sorrel) +    13 Mug wort (artemisia vulgare) +    14 Ragweed giant/short (ambrosia spp) -    15 English Plantain (plantago lanceolata) -    16 Tree mix 1 (Pecan, Maple BHR, Oak RVW, american Walsh, black [place]) +    17 Red cedar (juniperus virginia) +    18 Tree mix 2   (white Frederic, river/red Birch, black [place], common [place], american Elm) -    19 Box elder/Maple mix (acer spp) -    20 Medina shagbark (carya ovata) -       -      Conclusion: patient is atopic with sensitization to Alternaria mold and some pollens (weed/tree)      Intradermal Testing (Placed 11/03/20 )    No Substance Conc.  Readings (15min) Evaluation   - NaCl  0.9% neg    + Histamine (prick) 0.1mg / ml -    DF Standard Dust Mite - D. Farinae 1:10 neg    DP Standard Dust Mite - D. Pteronyssinus 1:10 neg    A Aspergillus fumigatus  1:10 + 7mmP/E   P Penicillium notatum 1:10 neg 5mmP, no E     Conclusion: slight reaction in intradermal test to Aspergillus, but not to dust mites after 15min. After 1 day all reactions gone    [] No relevant allergic reaction observed    [] Allergic reaction diagnosed against following allergens:      Interpretation/ remarks:   See later    [] Patient information given   [] ACDS CAMP information's (# ....) to following compounds: .....   [] General information's to following compounds: ......      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 with total IgE not elevated)    Maybe combination of red meat and NSAIDs (Ibuprofen can lead to the angioedemas)    Emergency set: Prednisone 100mg and 360mg Fexofenadine  --> avoid eating red meat and particularly taking NSAIDs at the same time    >> Patient had anaphylactic reaction with generalized Urticaria and breathing problems to bee and wasp    Patient has specific IgE to wasp (0.99), but NOT to bee (neg)    These conclusions are made at the best of one's knowledge and belief based on the provided evidence such as patient's history and allergy test results and they can change over time or can be incomplete because of missing information's.    ==> Treatment prescribed/Plan:    - Doxepin 25mg every evening  - Fexofenadine 180mg every morning   --> avoid red meat  --> if possible do not take Ibuprofen Tabl (can take instead Tylenol if necessary), but write down when taken    >> take care of the atopic skin:    Vanicream cream 2x daily entire body    Free&Clear Shampoo and soap    Mometasone ointment on Saturday and Sunday on the lesions    Protopic ointment 0.1% from Monday to Friday on lesions (was too expensive, never used)    --> if rash gets worse, then consider use of Dupixent s.c.  --> patient will make photo and feed back to us if any delayed reaction to intradermal tests      Blood tests to be tested (involved nurse):  Total IgE, IgE bee, IgE wasp, Tryptase, CBC, HbA1c    Follow-up: in Derm-Allergy clinic for 1st readings of patch tests after 2 days (virtual) and 2nd readings and final conclusions after 4 days (in person)      Thank you for the opportunity be involved in the care of this patient.     Staff: Keesha Garces LPN and Donna Bose RN    I spent a total of 27  minutes face to face with Jaspreet Ridley during today s office visit. Over 50% of this time was spent discussing all the individual test results, correlating them to the clinical relevance, counseling the patient and/or coordinating care. Please see Assessment and Plan for details.  This excludes any time spent performing patch tests      Again, thank you for allowing me to participate in the care of your patient.        Sincerely,        Jonas Garza MD

## 2021-01-13 ENCOUNTER — TELEPHONE (OUTPATIENT)
Dept: DERMATOLOGY | Facility: CLINIC | Age: 66
End: 2021-01-13

## 2021-01-13 ENCOUNTER — VIRTUAL VISIT (OUTPATIENT)
Dept: ALLERGY | Facility: CLINIC | Age: 66
End: 2021-01-13
Payer: COMMERCIAL

## 2021-01-13 DIAGNOSIS — L20.89 OTHER ATOPIC DERMATITIS: ICD-10-CM

## 2021-01-13 DIAGNOSIS — L23.89 ALLERGIC CONTACT DERMATITIS DUE TO OTHER AGENTS: Primary | ICD-10-CM

## 2021-01-13 PROCEDURE — 99207 PR NO CHARGE LOS: CPT | Performed by: DERMATOLOGY

## 2021-01-13 ASSESSMENT — PAIN SCALES - GENERAL: PAINLEVEL: NO PAIN (0)

## 2021-01-13 NOTE — PROGRESS NOTES
Patient came at the end in[place]person [place] [facility] [place]

## 2021-01-13 NOTE — LETTER
1/13/2021         RE: Jaspreet Ridley  [address] [place] [address]        Dear Colleague,    Thank you for referring your patient, Jaspreet Ridley, to the [facility] [place]. Please see a copy of my visit note below.    Note for return visit for Dermato-Allergy       Encounter Date: Jan 13, 2021    History of Present Illness:  Jaspreet Ridley is a 65 year old male who presents in person to the consult following information has been take directly from the prior notes, patients informations, Epic and/or other sources and exam/history performed by myself:       Patients consult for 1st readings of patch tests after 2 days (virtual)     Additional comments and observations from review of the patient s chart including the following:    See notes    ROS: Patient generally feeling well today   Physical Examination:  General: Well-appearing, appropriately-developed individual.  - Skin: Examination of the skin on test sites within the teledermatology was performed.   See test results below  As above in HPI. No additional skin concerns.  - upper respiratory tract: currently no obvious Rhinitis  - lungs: no signs for active and present Asthma/Wheezing or coughing  - eyes: currently no active conjunctivits  - GI system/digestion: currently no problems, no bloating or Diarrhoea    Earlier History and Allergy exams:    Patient stopped using many different products and stopped using perfumes. Had in December on upper right arm for few days acute eczematous lesions and similar lesions on the chest area and disappeared after 3-4 days using topical corticosteroids    Patient previously seen for itching and rash. Was to follow up in August 2020 for Prick/Patch testing, but his rash had resolved and so he cancelled the testing. He says that now it has returned again within the last 10 days. He is here today to try and figure out what is going on.     Eats red meat (not major quantities) and no  problem    Had a flare up on 06/26 a flare up of a dermatitis on the right upper and lower arm and the lesions seem to appear always on same location and seem to stay longer than 24h and skin stays dry (now today still slight erythema and dryness)    Monday morning after mothers day patient had a reaction (woke up). Went to sleep on Sunday evening (went to sleep around 11pm and woke up about 2h later with sweating and had chest and neck again burning and itching sensations and next morning some on the arm pits.   Had this Sunday 2 eggs for breakfast and bread for brunch and dinner around 7/8pm and ate Chicken Fajitas. No alcohol. No additional pain medications except daily Oxycodon for chronic neck pain.   --> patient did not eat red meat on this day  --> however, patient did not do the treatment with Doxepin or Singulair and took only Allegra in the morning (Compliance?).    Patient has almost every 2nd evening these sweatings and itching attacks and therefore not better (however, compliance not very good)    Previous History of Present Illness:  04/07/2020 virtual consultation:  Middle December 2019 first on head itching and then over the strap of the CEPAP sleep apnoe mask and then over neck and later over the shoulders. First week of February in went to Dermatology --> diagnosed Folliculitis and got antibiotic and topicals. Since then got urticarial lesions all over the upper body.   Patient travelled much for work and beginning of February went to [place].   Patient had a more severe reaction with lip swelling and breathing problems on 4th February woke up with Urticaria and around 10:30 patient was short of breath (not really angioedema).  Flew then the same evening from [place] to [place]. On the morning of the 5th of February bottom lip all puffy and swollen, not really short of breath. In ER with antihistamines itch went away and steroid injection.  After 6-7 days Urticaria came back and then went back to  [facility]. They took there a biopsy and there again steroid injection.     Usually the lesions appear after sweating and they can itch or burn. After 3-4hours the lesions are still there.      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   - history of hay fever and asthma as a child    Current medications:   Tizanidine muscle relaxants since 7 years (Zanaflex) --> stopped 3-4 days and no changes  Oxycodone every day  Metaxolone muscle relaxants  Montelukast from Dr. Yi  Famotidine from Dr. Yi  Vitamine D  Cetirizine 10mg 2xdaily  Diphenhydramine (Benadryl)    No blood pressure medications, no diabetes  Tries to avoid NSAIDs (sometimes Ibuprofen 800mg --> 1-2 times per month)      Allergy History:     Allergies   Allergen Reactions     Alpha-Gal Fatigue, Headache, Hives, Rash and Shortness Of Breath     Bee Pollen Anaphylaxis     Iodine Anaphylaxis     Contrast Dye      Fentanyl Hives     Iodine-131      --> as child pollen allergies. Still today in fall with ragweed rhinoconjunctivitis  --> as a child Asthma and had Immunotherapy    Social History:  The patient works as a . Patient has the following hobbies or non-occupational exposure     reports that he has quit smoking. He does not have any smokeless tobacco history on file.      Family History:  No family history on  file.    Medications:  Current Outpatient Medications   Medication Sig Dispense Refill     cetirizine (ZYRTEC) 10 MG tablet Take 40-50 mg by mouth       diphenhydrAMINE (BENADRYL) 25 MG tablet Take 25-50 mg by mouth       doxepin (SINEQUAN) 25 MG capsule Take 1 capsule (25 mg) by mouth At Bedtime 60 capsule 0     doxepin (SINEQUAN) 25 MG capsule Take 1 capsule (25 mg) by mouth At Bedtime 60 capsule 0     doxepin (SINEQUAN) 25 MG capsule Take 1 capsule (25 mg) by mouth At Bedtime 30 capsule 1     emollient (VANICREAM) external cream Apply topically 2 times daily Free &Clear soap and shampoo 453 g 4     metaxalone (SKELAXIN) 800 MG tablet 800 mg       Misc. Devices (CERVICAL TRACTION) KIT Over the Door Cervical Traction.  May be obtained through [employer] or ordered from the internet.  Traction weight from 8 to 15 pounds.  Not to exceed more than 15 pounds of traction weight.  Begin 10 minutes of traction daily and increase to 15 minutes, two to three times every day. 1 kit 0     mometasone (ELOCON) 0.1 % external ointment On Saturday and Sunday on lesions 45 g 3     montelukast (SINGULAIR) 10 MG tablet Take 1 tablet (10 mg) by mouth At Bedtime 60 tablet 0     montelukast (SINGULAIR) 10 MG tablet Take 10 mg by mouth every evening       oxyCODONE IR (ROXICODONE) 10 MG tablet Take 10 mg by mouth daily       predniSONE (DELTASONE) 50 MG tablet Emergency set: 2 tablet 3     tiZANidine (ZANAFLEX) 4 MG tablet Take 4 mg by mouth At Bedtime       vitamin D2 (ERGOCALCIFEROL) 10581 units (1250 mcg) capsule Take 50,000 Units by mouth         Allergy Tests:    Past Allergy Test: in the 60s = ragweed, dust mites, feathers    Future Allergy Test:   >> plan in future atopy screen prick test and maybe oral provocation test with red meat    Order for PATCH TESTS    [x] Outpatient  [] Inpatient: [facility]..../ Bed ....      Skin Atopy (atopic dermatitis) [x] Yes   [] No since Dec 2019  Rhinitis/Sinusitis:   [x] Yes    [] No as child with positive skin tests and IT as child  Allergic Asthma:   [x] Yes   [] No ? As child  Food Allergy:   [x] Yes   [] No  Leg ulcers:   [] Yes   [] No  Hand eczema:   [] Yes   [] No   Leading hand:   [] R   [] L       [] Ambidextrous                        Reason for tests (suspected allergy): recurrent dermatitis on extremities DDx atopic dermatitis and/or allergic contact dermatitis  Known previous allergies: none    Standardized panels  [x] Standard panel (40 tests)  [x] Preservatives & Antimicrobials (31 tests)  [x] Emulsifiers & Additives (25 tests)   [x] Perfumes/Flavours & Plants (25 tests) wife uses essential oil diffuser  [] Hairdresser panel (12 tests)  [x] Rubber Chemicals (22 tests)  [] Plastics (26 tests)  [] Colorants/Dyes/Food additives (20 tests)  [] Metals (implants/dental) (24 tests)  [] Local anaesthetics/NSAIDs (14 tests)  [] Antibiotics & Antimycotics (14 tests)   [x] Corticosteroids (15 tests)   [] Photopatch test (62 tests)   [] others: ...      [x] Patient's own products: Benadryl cream (make it worse), Calamine    lotion and wichhazel = to do on Friday    DO NOT test if chemical or biological identity is unknown!     always ask from patient the product information and safety sheets (MSDS)   [x] Atopy screen prick test (Atopic predisposition): if possible IDT molds and HDM    RESULTS & EVALUATION of PATCH TESTS    Patch test readings after     [x] 2 days, [] 3 days [x] 4 days, [] 5 days,    Applied patch tests with results (import here the list of patch tests):    STANDARD Series      No Substance 2 days 4 days remarks   1 Neil Mix [C] - -    2 Colophony - -    3  2-Mercaptobenzothiazole  - -     4 Methylisothiazolinone - -    5 Carba Mix - -    6 Thiuram Mix [A] - -    7 Bisphenol A Epoxy Resin - -    8 K-Qumv-Oaendrvhxab-Formaldehyde Resin - -    9 Mercapto Mix [A] - -    10 Black Rubber Mix- PPD [B] - -    11 Potassium Dichromate  -  -    12 Balsam of [place] (Myroxylon  Pereirae Resin) - -    13 Nickel Sulphate Hexahydrate - -    14 Mixed Dialkyl Thiourea - -    15 Paraben Mix [B] - -    16 Methyldibromo Glutaronitrile - -    17 Fragrance Mix - -    18 2-Bromo-2-Nitropropane-1,3-Diol (Bronopol) - -    19 Lyral - -    20 Tixocortol-21- Pivalate - -    21 Diazolidiyl Urea (Germall II) - -    22 Methyl Methacrylate - -    23 Cobalt (II) Chloride Hexahydrate - -    24 Fragrance Mix II  - -    25 Compositae Mix - -    26 Benzoyl Peroxide - -    27 Bacitracin - -    28 Formaldehyde - -    29 Methylchloroisothiazolinone / Methylisothiazolinone - -    30 Corticosteroid Mix - -    31 Sodium Lauryl Sulfate - -    32 Lanolin Alcohol - -    33 Turpentine - -    34 Cetylstearylalcohol - -    35 Chlorhexidine Dicluconate - -    36 Budenoside - -    37 Imidazolidinyl Urea  - -    38 Ethyl-2 Cyanoacrylate - -    39 Quaternium 15 (Dowicil 200) - -    40 Decyl Glucoside - -      CORTICOSTEROIDS   No Substance 2 days 4 days remarks Allergy  class   41 1 Amcinonide - -  B   42 2 Betametasone-17,21 Dipropionate - -  D1   43 3 Desoximetasone - -  C   44 4 Betamethasone-17-Valerate - -  D1   45 5 Dexamethasone - -  C   46 6 Hydrocortisone - -  A   47 7 Clobetasol-17-Propionate - -  D1   48 8 Dexamethasone-21-Phosphate Disodium Salt - -  C   49 9 Hydrocortisone-17 Butyrate - -  D2   50 10 Prednisolone - -  A   51 11 Mometason Furoate - -  D1   52 12 Triamcinolone Acetonide - -  B   53 13 Methylprednisolone Aceponate - -  D2   54 14 Hydrocortisone-21-Acetate - -  A   55 15 Prednicarbate - -  D2     Group Characteristics of group Generic name Name  cross reactions   A Hydrocortisone   Cloprednole, Fludrocortisone acétate, Hydrocortison acetate, Methylprednisolone, Prednisolone, Tixocortolpivalate Alfacortone, Fucidin H, Dermacalm, Hexacortone, Premandole, Imacort With group D2   B Triamcinolone-acetonide   Budenoside (R-isomer), Amcinonide, Desonide, Fluocinolone acetonide, Triamcinolone acetonide Locapred,  Locatop  Synalar, Pevisone, Kenacort -   C Betamethasone (Without Lashon)   Betamethasone, Dexamethasone, Flumethasone pivalate, Halomethasone Daivobet, Dexasalyl, Locasalen,   -   D1 Betamethasone-diproprionate   Betamethasone dipropionate, Betamethasone-17-valerate, Clobetasole-propionate, Fluticasone propionate, Mometasone furoate Betnovate, Diprogenta, Diprosalic, Diprosone, Celestoderm, Fucicort,  Cutivate, Axotide, Elocom -   D2 Methylprednisolone-aceponate   Hydrocortisone-aceponate, Hydrocortisone-buteprate, Hydrocortisone-17-butyrate, Methylprednisolone aceponate, Prednicarbate Locoïd, Advantan,  Prednitop With group A and Budesonide (S-isomer)     EMULSIFIERS & ADDITIVES   No Substance 2 days 4 days remarks   56 1 Polyethylene Glycol-400 - -    57 2 Cocamidopropyl Betaine - -    58 3 Amerchol L101 - -    59 4 Propylene Glycol - -    60 5 Triethanolamine - -    61 6 Sorbitane Sesquiolate - -    62 7 Isopropylmyristate - -    63 8 Polysorbate 80  - -    64 9 Amidoamine   (Stearamidopropyl Dimethylamine) - -    65 10 Oleamidopropyl Dimethylamine - -    66 11 Lauryl Glucoside - -    67 12 Coconut Diethanolamide  - -    68 13 2-Hydroxy-4-Methoxy Benzophenone (Oxybenzone) - -    69 14 Benzophenone-4 (Sulisobenzon) - -    70 15 Propolis - -    71 16 Dexpanthenol - -    72 17 Carboxymethyl Cellulose Sodium - -    73 18 Abitol - -    74 19 Tert-Butylhydroquinone - -    75 20 Benzyl Salicylate - -      Antioxidant      76 21 Dodecyl Gallate - -    77 22 Butylhydroxyanisole (BHA) - -    78 23 Butylhydroxytoluene (BHT) - -    79 24 Di-Alpha-Tocopherol (Vit E) - -    80 25 Propyl Gallate - -    81 26 Zinc Pyrithione      82 27 Dimethylaminopropylamin (DMAPA)        PRESERVATIVES & ANTIMICROBIALS     No Substance 2 days 4 days remarks   83 1  1,2-Benzisothiazoline-3-One, Sodium Salt - -    84 2  1,3,5-Ross (2-Hydroxyethyl) - Hexahydrotriazine (Kamar CARTER) - -    85 3 5-Fjymyjxwymttn-2-Nitro-1, 3-Propanediol - -    86 4   3, 4, 4' - Triclocarban - -    87 5 4 - Chloro - 3 - Cresol - -    88 6 4 - Chloro - 4 - Xylenol (PCMX) - -    89 7 7-Ethylbicyclooxazolidine (Bioban [id number]) - -    90 8 Benzalkonium Chloride - -    91 9 Benzyl Alcohol - -    92 10 Cetalkonium Chloride - -    93 11 Cetylpyrimidine Chloride  - -    94 12 Chloroacetamide - -    95 13 DMDM Hydantoin - -    96 14 Glutaraldehyde - -    97 15 Triclosan - -    98 16 Glyoxal Trimeric Dihydrate - -    99 17 Iodopropynyl Butylcarbamate - -    100 18 Octylisothiazoline - -    101 19 Iodoform - -    102 20 (Nitrobutyl) Morpholine/(Ethylnitro-Trimethylene) Dimorpholine (Bioban P 1487) - -    103 21 Phenoxyethanol - -    104 22 Phenyl Salicylate - -    105 23 Povidone Iodine - -    106 24 Sodium Benzoate - -    107 25 Sodium Disulfite - -    108 26 Sorbic Acid - -    109 27 Thimerosal - -    110 28 Melamine Formeladyde Resin      111 29 Ethylenediamine Dihydrochloride        Parabens      112 30 Butyl-P-Hydroxybenzoate - -    113 31 Ethyl-P-Hydroxybenzoate - -    114 32 Methyl-P-Hydroxybenzoate - -    115 33 Propyl-P-Hydroxybenzoate - -      PERFUMES, FLAVORS & PLANTS      No Substance 2 days 4 days remarks   116 1 Benzyl Cinnamate - -    117 2 Di-Limonene (Dipentene) - -    118 3 Cananga Odorata (Jacqueline Phillips) (I) - -    119 4 Lichen Acid Mix - -    120 5 Mentha Piperita Oil (Peppermint Oil) - -    121 6 Sesquiterpenelactone mix - -    122 7 Tea Tree Oil, Oxidized - -    123 8 Wood Tar Mix (+) -    124 9 Abietic Acid - -    125 10 Lavendula Angustifolia Oil (Lavender Oil) - -    126 11 Camphor  - -      Fragrance Mix I      127 12 Oakmoss Absolute - -    128 13 Eugenol - -    129 14 Geraniol - -    130 15 Hydroxycitronellal - -    131 16 Isoeugenol - -    132 17 Cinnamic Aldehyde - -    133 18 Cinnamic Alcohol  - -      Fragrance mix II      134 19 Citronellol - -    135 20 Alpha-Hexylcinnamic Aldehyde    - -    136 21 Citral - -    137 22 Farnesol - -    18 23 Coumarin - -       RUBBER CHEMICALS     No Substance 2 days 4 days remarks   139  Carbamate      140 1 Zinc Bis ( Diethyldithio carbamate ) (ZDEC) - -    141 2 Zinc Bis (Dibutyldithiocarbamate) - -    142 3 1,3-Diphenylguanidine (DPG) - -      Thiourea      143 4 Dibutylthiourea - -    144 5 Diphenyltiourea - -    145 6 Thiourea - -      Mercapto chemicals      146 7 Morpholinyl Mercaptobenzothiazole - -    147 8 E-Cimsnubzdr-1-Benzothiazyl-Sulfenamide - -    148 9 Dibenzothiazyl Disulfide - -      Thiuram chemicals      149 10 Dipentamethylenethiuram Disulfide - -    150 11 Tetraethylthiuram Disulfide (Disulfiram) - -    151 12 Tetramethylthiuram Disulfide - -    152 13 Tetramethyl Thiuram Monosulfide (TMTM) - -      4-Phenylenediamine derivatives      153 14 N-Isopropyl-N'-Phenyl-P-Phenylenediamine (IPPD) - -    154 15 Fqokjrgr-O-Pscrkpbtjfauhagh (DPPD) - -      Various Rubber Additives      155 16 Hydroquinone Monobenzylether  - -    156 17 Hexamethylenetetramine - -    157 18 4,4'-Dihydroxybiphenyl - -    158 19 Cyclohexylthiophtalimide - -    159 20 N-Phenyl-B-Naphthylamine - -    160 21 Dodecyl Mercaptan - -      OTHER PRODUCTS      No Substance Conc  % solv 2 days 4 days remarks   161 1 Benadryl Cream        162 2 Calamine        163 3 Whichhazel        164 4         165 5         166 6         167 7         168 8         169 9         170 10           Results of patch tests:                         Interpretation:    - Negative                    A    = Allergic      (+) Erythema    TI   = Toxic/irritant   + E + Infiltration    RaP = Relevance at Present     ++ E/I + Papulovesicle   Rpr  = Relevance Previously     +++ E/I/P + Blister     nR   = No Relevance        Atopy Screen (Placed 11/03/20 )    No Substance Readings (15 min) Evaluation   POS Histamine 1mg/ml ++    NEG NaCl 0.9% -      No Substance Readings (15 min) Evaluation   1 Alternaria alternata (tenuis)  ++    2 Cladosporium herbarum -    3 Aspergillus fumigatus  -    4 Penicillium notatum -    5 Dermatophagoides pteronyssinus -    6 Dermatophagoides farinae -    7 Dog epithelium (canis spp) -    8 Cat hair (marco catus) -    9 Cockroach   (Blatella americana & germanica) -    10 Grass mix midwest   (Kellee, Orchard, Redtop, Francisco Javier) -    11 Jj grass (sorghum halepense) -    12 Weed mix   (common Cocklebur, Lamb s quarters, rough redroot Pigweed, Dock/Sorrel) +    13 Mug wort (artemisia vulgare) +    14 Ragweed giant/short (ambrosia spp) -    15 English Plantain (plantago lanceolata) -    16 Tree mix 1 (Pecan, Maple BHR, Oak RVW, american [place], black [place]) +    17 Red cedar (juniperus virginia) +    18 Tree mix 2   (white Frederic, river/red Birch, black [place], common [place], american Elm) -    19 Box elder/Maple mix (acer spp) -    20 [place] shagbark (carya ovata) -       -      Conclusion: patient is atopic with sensitization to Alternaria mold and some pollens (weed/tree)      Intradermal Testing (Placed 11/03/20 )    No Substance Conc.  Readings (15min) Evaluation   - NaCl  0.9% neg    + Histamine (prick) 0.1mg / ml -    DF Standard Dust Mite - D. Farinae 1:10 neg    DP Standard Dust Mite - D. Pteronyssinus 1:10 neg    A Aspergillus fumigatus  1:10 + 7mmP/E   P Penicillium notatum 1:10 neg 5mmP, no E     Conclusion: slight reaction in intradermal test to Aspergillus, but not to dust mites after 15min. After 1 day all reactions gone    [x] No relevant allergic reaction observed    [] Allergic reaction diagnosed against following allergens:      Interpretation/ remarks:   See later    [] Patient information given   [] ACDS CAMP information's (# ....) to following compounds: .....   [] General information's to following compounds: ......      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 with total IgE not elevated)    Maybe combination of red meat and NSAIDs (Ibuprofen can lead to the angioedemas)    Emergency set: Prednisone 100mg and 360mg Fexofenadine  --> avoid eating red meat and particularly taking NSAIDs at the same time    >> Patient had anaphylactic reaction with generalized Urticaria and breathing problems to bee and wasp    Patient has specific IgE to wasp (0.99), but NOT to bee (neg)    These conclusions are made at the best of one's knowledge and belief based on the provided evidence such as patient's history and allergy test results and they can change over time or can be incomplete because of missing information's.    ==> Treatment prescribed/Plan:    - Doxepin 25mg every evening  - Fexofenadine 180mg every morning   --> avoid red meat  --> if possible do not take Ibuprofen Tabl (can take instead Tylenol if necessary), but write down when taken    >> take care of the atopic skin:    Vanicream cream 2x daily entire body    Free&Clear Shampoo and soap    Mometasone ointment on Saturday and Sunday on the lesions    Protopic ointment 0.1% from Monday to Friday on lesions (was too expensive, never used)    --> if rash gets worse, then consider use of Dupixent s.c.  --> patient will make photo and feed back to us if any delayed reaction to intradermal tests      Blood tests to be tested (involved nurse):  Total IgE, IgE bee, IgE wasp, Tryptase, CBC, HbA1c    Follow-up: in Derm-Allergy clinic for 2nd readings and final conclusions after 4 days (in person)      Thank you for the opportunity be involved in the care of this patient.     Staff: Keesha Garces LPN       I spent a total of 10 minutes face to face with Jaspreet Ridley during today s office visit. Over 50% of  this time was spent discussing all the individual test results, correlating them to the clinical relevance, counseling the patient and/or coordinating care. Please see Assessment and Plan for details.      Patient came at the end in-person to the [facility] derm-allergy clinic        Again, thank you for allowing me to participate in the care of your patient.        Sincerely,        Jonas Garza MD

## 2021-01-13 NOTE — NURSING NOTE
Allergy Rooming Note    Jaspreet Ridley's goals for this visit include:   Chief Complaint   Patient presents with     Allergy Testing Followup     Jaspreet is here for patch testing day 3     Keesha Garces LPN     Increase Myrbetriq dose to 50mg (2 capsules).     Estrogen cream three times a week near urethra (pea-sized amount): If >$40, let me know and we can get via a compound pharmacy.    Below is a list of things that can irritate the bladder and should be eliminated:      Caffeinated soft drinks.    Coffee.    Tea.    Chocolate.    Tomato-based foods.    Acidic juices and fruits. (includes cranberry juice)    Alcohol.    Carbonated drinks.    Aspartame/Nutrasweet.

## 2021-01-13 NOTE — PROGRESS NOTES
Note for return visit for Dermato-Allergy       Encounter Date: Jan 13, 2021    History of Present Illness:  Jaspreet Ridley is a 65 year old male who presents in person to the consult following information has been take directly from the prior notes, patients informations, Epic and/or other sources and exam/history performed by myself:       Patients consult for 1st readings of patch tests after 2 days (virtual)     Additional comments and observations from review of the patient s chart including the following:    See notes    ROS: Patient generally feeling well today   Physical Examination:  General: Well-appearing, appropriately-developed individual.  - Skin: Examination of the skin on test sites within the teledermatology was performed.   See test results below  As above in HPI. No additional skin concerns.  - upper respiratory tract: currently no obvious Rhinitis  - lungs: no signs for active and present Asthma/Wheezing or coughing  - eyes: currently no active conjunctivits  - GI system/digestion: currently no problems, no bloating or Diarrhoea    Earlier History and Allergy exams:    Patient stopped using many different products and stopped using perfumes. Had in December on upper right arm for few days acute eczematous lesions and similar lesions on the chest area and disappeared after 3-4 days using topical corticosteroids    Patient previously seen for itching and rash. Was to follow up in August 2020 for Prick/Patch testing, but his rash had resolved and so he cancelled the testing. He says that now it has returned again within the last 10 days. He is here today to try and figure out what is going on.     Eats red meat (not major quantities) and no problem    Had a flare up on 06/26 a flare up of a dermatitis on the right upper and lower arm and the lesions seem to appear always on same location and seem to stay longer than 24h and skin stays dry (now today still slight erythema and dryness)    Monday  morning after mothers day patient had a reaction (woke up). Went to sleep on Sunday evening (went to sleep around 11pm and woke up about 2h later with sweating and had chest and neck again burning and itching sensations and next morning some on the arm pits.   Had this Sunday 2 eggs for breakfast and bread for brunch and dinner around 7/8pm and ate Chicken Fajitas. No alcohol. No additional pain medications except daily Oxycodon for chronic neck pain.   --> patient did not eat red meat on this day  --> however, patient did not do the treatment with Doxepin or Singulair and took only Allegra in the morning (Compliance?).    Patient has almost every 2nd evening these sweatings and itching attacks and therefore not better (however, compliance not very good)    Previous History of Present Illness:  04/07/2020 virtual consultation:  Middle December 2019 first on head itching and then over the strap of the CEPAP sleep apnoe mask and then over neck and later over the shoulders. First week of February in went to Dermatology --> diagnosed Folliculitis and got antibiotic and topicals. Since then got urticarial lesions all over the upper body.   Patient travelled much for work and beginning of February went to [place].   Patient had a more severe reaction with lip swelling and breathing problems on 4th February woke up with Urticaria and around 10:30 patient was short of breath (not really angioedema).  Flew then the same evening from [place] to [place]. On the morning of the 5th of February bottom lip all puffy and swollen, not really short of breath. In ER with antihistamines itch went away and steroid injection.  After 6-7 days Urticaria came back and then went back to [facility]. They took there a biopsy and there again steroid injection.     Usually the lesions appear after sweating and they can itch or burn. After 3-4hours the lesions are still there.      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   - history of hay fever and asthma as a child    Current medications:   Tizanidine muscle relaxants since 7 years (Zanaflex) --> stopped 3-4 days and no changes  Oxycodone every day  Metaxolone muscle relaxants  Montelukast from Dr. Yi  Famotidine from Dr. Yi  Vitamine D  Cetirizine 10mg 2xdaily  Diphenhydramine (Benadryl)    No blood pressure medications, no diabetes  Tries to avoid NSAIDs (sometimes Ibuprofen 800mg --> 1-2 times per month)      Allergy History:     Allergies   Allergen Reactions     Alpha-Gal Fatigue, Headache, Hives, Rash and Shortness Of Breath     Bee Pollen Anaphylaxis     Iodine Anaphylaxis     Contrast Dye      Fentanyl Hives     Iodine-131      --> as child pollen allergies. Still today in fall with ragweed rhinoconjunctivitis  --> as a child Asthma and had Immunotherapy    Social History:  The patient works as a . Patient has the following hobbies or non-occupational exposure     reports that he has quit smoking. He does not have any smokeless tobacco history on file.      Family History:  No family history on file.    Medications:  Current Outpatient Medications   Medication Sig Dispense Refill     cetirizine (ZYRTEC) 10 MG tablet Take 40-50 mg by mouth       diphenhydrAMINE (BENADRYL) 25 MG tablet Take 25-50 mg by mouth       doxepin (SINEQUAN) 25 MG capsule Take 1 capsule (25  mg) by mouth At Bedtime 60 capsule 0     doxepin (SINEQUAN) 25 MG capsule Take 1 capsule (25 mg) by mouth At Bedtime 60 capsule 0     doxepin (SINEQUAN) 25 MG capsule Take 1 capsule (25 mg) by mouth At Bedtime 30 capsule 1     emollient (VANICREAM) external cream Apply topically 2 times daily Free &Clear soap and shampoo 453 g 4     metaxalone (SKELAXIN) 800 MG tablet 800 mg       Misc. Devices (CERVICAL TRACTION) KIT Over the Door Cervical Traction.  May be obtained through [employer] or ordered from the internet.  Traction weight from 8 to 15 pounds.  Not to exceed more than 15 pounds of traction weight.  Begin 10 minutes of traction daily and increase to 15 minutes, two to three times every day. 1 kit 0     mometasone (ELOCON) 0.1 % external ointment On Saturday and Sunday on lesions 45 g 3     montelukast (SINGULAIR) 10 MG tablet Take 1 tablet (10 mg) by mouth At Bedtime 60 tablet 0     montelukast (SINGULAIR) 10 MG tablet Take 10 mg by mouth every evening       oxyCODONE IR (ROXICODONE) 10 MG tablet Take 10 mg by mouth daily       predniSONE (DELTASONE) 50 MG tablet Emergency set: 2 tablet 3     tiZANidine (ZANAFLEX) 4 MG tablet Take 4 mg by mouth At Bedtime       vitamin D2 (ERGOCALCIFEROL) 70859 units (1250 mcg) capsule Take 50,000 Units by mouth         Allergy Tests:    Past Allergy Test: in the 60s = ragweed, dust mites, feathers    Future Allergy Test:   >> plan in future atopy screen prick test and maybe oral provocation test with red meat    Order for PATCH TESTS    [x] Outpatient  [] Inpatient: [facility]..../ Bed ....      Skin Atopy (atopic dermatitis) [x] Yes   [] No since Dec 2019  Rhinitis/Sinusitis:   [x] Yes   [] No as child with positive skin tests and IT as child  Allergic Asthma:   [x] Yes   [] No ? As child  Food Allergy:   [x] Yes   [] No  Leg ulcers:   [] Yes   [] No  Hand eczema:   [] Yes   [] No   Leading hand:   [] R   [] L       [] Ambidextrous                         Reason for tests (suspected allergy): recurrent dermatitis on extremities DDx atopic dermatitis and/or allergic contact dermatitis  Known previous allergies: none    Standardized panels  [x] Standard panel (40 tests)  [x] Preservatives & Antimicrobials (31 tests)  [x] Emulsifiers & Additives (25 tests)   [x] Perfumes/Flavours & Plants (25 tests) wife uses essential oil diffuser  [] Hairdresser panel (12 tests)  [x] Rubber Chemicals (22 tests)  [] Plastics (26 tests)  [] Colorants/Dyes/Food additives (20 tests)  [] Metals (implants/dental) (24 tests)  [] Local anaesthetics/NSAIDs (14 tests)  [] Antibiotics & Antimycotics (14 tests)   [x] Corticosteroids (15 tests)   [] Photopatch test (62 tests)   [] others: ...      [x] Patient's own products: Benadryl cream (make it worse), Calamine    lotion and wichhazel = to do on Friday    DO NOT test if chemical or biological identity is unknown!     always ask from patient the product information and safety sheets (MSDS)   [x] Atopy screen prick test (Atopic predisposition): if possible IDT molds and HDM    RESULTS & EVALUATION of PATCH TESTS    Patch test readings after     [x] 2 days, [] 3 days [x] 4 days, [] 5 days,    Applied patch tests with results (import here the list of patch tests):    STANDARD Series      No Substance 2 days 4 days remarks   1 Neil Mix [C] - -    2 Colophony - -    3  2-Mercaptobenzothiazole  - -     4 Methylisothiazolinone - -    5 Carba Mix - -    6 Thiuram Mix [A] - -    7 Bisphenol A Epoxy Resin - -    8 S-Kkdt-Dfywhhjiytd-Formaldehyde Resin - -    9 Mercapto Mix [A] - -    10 Black Rubber Mix- PPD [B] - -    11 Potassium Dichromate  -  -    12 Balsam of [place] (Myroxylon Pereirae Resin) - -    13 Nickel Sulphate Hexahydrate - -    14 Mixed Dialkyl Thiourea - -    15 Paraben Mix [B] - -    16 Methyldibromo Glutaronitrile - -    17 Fragrance Mix - -    18 2-Bromo-2-Nitropropane-1,3-Diol (Bronopol) - -    19 Lyral - -    20 Tixocortol-21-  Pivalate - -    21 Diazolidiyl Urea (Germall II) - -    22 Methyl Methacrylate - -    23 Cobalt (II) Chloride Hexahydrate - -    24 Fragrance Mix II  - -    25 Compositae Mix - -    26 Benzoyl Peroxide - -    27 Bacitracin - -    28 Formaldehyde - -    29 Methylchloroisothiazolinone / Methylisothiazolinone - -    30 Corticosteroid Mix - -    31 Sodium Lauryl Sulfate - -    32 Lanolin Alcohol - -    33 Turpentine - -    34 Cetylstearylalcohol - -    35 Chlorhexidine Dicluconate - -    36 Budenoside - -    37 Imidazolidinyl Urea  - -    38 Ethyl-2 Cyanoacrylate - -    39 Quaternium 15 (Dowicil 200) - -    40 Decyl Glucoside - -      CORTICOSTEROIDS   No Substance 2 days 4 days remarks Allergy  class   41 1 Amcinonide - -  B   42 2 Betametasone-17,21 Dipropionate - -  D1   43 3 Desoximetasone - -  C   44 4 Betamethasone-17-Valerate - -  D1   45 5 Dexamethasone - -  C   46 6 Hydrocortisone - -  A   47 7 Clobetasol-17-Propionate - -  D1   48 8 Dexamethasone-21-Phosphate Disodium Salt - -  C   49 9 Hydrocortisone-17 Butyrate - -  D2   50 10 Prednisolone - -  A   51 11 Mometason Furoate - -  D1   52 12 Triamcinolone Acetonide - -  B   53 13 Methylprednisolone Aceponate - -  D2   54 14 Hydrocortisone-21-Acetate - -  A   55 15 Prednicarbate - -  D2     Group Characteristics of group Generic name Name  cross reactions   A Hydrocortisone   Cloprednole, Fludrocortisone acétate, Hydrocortison acetate, Methylprednisolone, Prednisolone, Tixocortolpivalate Alfacortone, Fucidin H, Dermacalm, Hexacortone, Premandole, Imacort With group D2   B Triamcinolone-acetonide   Budenoside (R-isomer), Amcinonide, Desonide, Fluocinolone acetonide, Triamcinolone acetonide Locapred, Locatop  Synalar, Pevisone, Kenacort -   C Betamethasone (Without Lashon)   Betamethasone, Dexamethasone, Flumethasone pivalate, Halomethasone Daivobet, Dexasalyl, Locasalen,   -   D1 Betamethasone-diproprionate   Betamethasone dipropionate, Betamethasone-17-valerate,  Clobetasole-propionate, Fluticasone propionate, Mometasone furoate Betnovate, Diprogenta, Diprosalic, Diprosone, Celestoderm, Fucicort,  Cutivate, Axotide, Elocom -   D2 Methylprednisolone-aceponate   Hydrocortisone-aceponate, Hydrocortisone-buteprate, Hydrocortisone-17-butyrate, Methylprednisolone aceponate, Prednicarbate Locoïd, Advantan,  Prednitop With group A and Budesonide (S-isomer)     EMULSIFIERS & ADDITIVES   No Substance 2 days 4 days remarks   56 1 Polyethylene Glycol-400 - -    57 2 Cocamidopropyl Betaine - -    58 3 Amerchol L101 - -    59 4 Propylene Glycol - -    60 5 Triethanolamine - -    61 6 Sorbitane Sesquiolate - -    62 7 Isopropylmyristate - -    63 8 Polysorbate 80  - -    64 9 Amidoamine   (Stearamidopropyl Dimethylamine) - -    65 10 Oleamidopropyl Dimethylamine - -    66 11 Lauryl Glucoside - -    67 12 Coconut Diethanolamide  - -    68 13 2-Hydroxy-4-Methoxy Benzophenone (Oxybenzone) - -    69 14 Benzophenone-4 (Sulisobenzon) - -    70 15 Propolis - -    71 16 Dexpanthenol - -    72 17 Carboxymethyl Cellulose Sodium - -    73 18 Abitol - -    74 19 Tert-Butylhydroquinone - -    75 20 Benzyl Salicylate - -      Antioxidant      76 21 Dodecyl Gallate - -    77 22 Butylhydroxyanisole (BHA) - -    78 23 Butylhydroxytoluene (BHT) - -    79 24 Di-Alpha-Tocopherol (Vit E) - -    80 25 Propyl Gallate - -    81 26 Zinc Pyrithione      82 27 Dimethylaminopropylamin (DMAPA)        PRESERVATIVES & ANTIMICROBIALS     No Substance 2 days 4 days remarks   83 1  1,2-Benzisothiazoline-3-One, Sodium Salt - -    84 2  1,3,5-Ross (2-Hydroxyethyl) - Hexahydrotriazine (Grotan BK) - -    85 3 8-Ohicgmxdanqjt-6-Nitro-1, 3-Propanediol - -    86 4  3, 4, 4' - Triclocarban - -    87 5 4 - Chloro - 3 - Cresol - -    88 6 4 - Chloro - 4 - Xylenol (PCMX) - -    89 7 7-Ethylbicyclooxazolidine ([facility] [id number]) - -    90 8 Benzalkonium Chloride - -    91 9 Benzyl Alcohol - -    92 10 Cetalkonium Chloride - -    93 11  Cetylpyrimidine Chloride  - -    94 12 Chloroacetamide - -    95 13 DMDM Hydantoin - -    96 14 Glutaraldehyde - -    97 15 Triclosan - -    98 16 Glyoxal Trimeric Dihydrate - -    99 17 Iodopropynyl Butylcarbamate - -    100 18 Octylisothiazoline - -    101 19 Iodoform - -    102 20 (Nitrobutyl) Morpholine/(Ethylnitro-Trimethylene) Dimorpholine (Bioban P 1487) - -    103 21 Phenoxyethanol - -    104 22 Phenyl Salicylate - -    105 23 Povidone Iodine - -    106 24 Sodium Benzoate - -    107 25 Sodium Disulfite - -    108 26 Sorbic Acid - -    109 27 Thimerosal - -    110 28 Melamine Formeladyde Resin      111 29 Ethylenediamine Dihydrochloride        Parabens      112 30 Butyl-P-Hydroxybenzoate - -    113 31 Ethyl-P-Hydroxybenzoate - -    114 32 Methyl-P-Hydroxybenzoate - -    115 33 Propyl-P-Hydroxybenzoate - -      PERFUMES, FLAVORS & PLANTS      No Substance 2 days 4 days remarks   116 1 Benzyl Cinnamate - -    117 2 Di-Limonene (Dipentene) - -    118 3 Cananga Odorata (Jacqueline Phillips) (I) - -    119 4 Lichen Acid Mix - -    120 5 Mentha Piperita Oil (Peppermint Oil) - -    121 6 Sesquiterpenelactone mix - -    122 7 Tea Tree Oil, Oxidized - -    123 8 Wood Tar Mix (+) -    124 9 Abietic Acid - -    125 10 Lavendula Angustifolia Oil (Lavender Oil) - -    126 11 Camphor  - -      Fragrance Mix I      127 12 Oakmoss Absolute - -    128 13 Eugenol - -    129 14 Geraniol - -    130 15 Hydroxycitronellal - -    131 16 Isoeugenol - -    132 17 Cinnamic Aldehyde - -    133 18 Cinnamic Alcohol  - -      Fragrance mix II      134 19 Citronellol - -    135 20 Alpha-Hexylcinnamic Aldehyde    - -    136 21 Citral - -    137 22 Farnesol - -    18 23 Coumarin - -      RUBBER CHEMICALS     No Substance 2 days 4 days remarks   139  Carbamate      140 1 Zinc Bis ( Diethyldithio carbamate ) (ZDEC) - -    141 2 Zinc Bis (Dibutyldithiocarbamate) - -    142 3 1,3-Diphenylguanidine (DPG) - -      Thiourea      143 4 Dibutylthiourea - -     144 5 Diphenyltiourea - -    145 6 Thiourea - -      Mercapto chemicals      146 7 Morpholinyl Mercaptobenzothiazole - -    147 8 V-Zffabhpnlk-2-Benzothiazyl-Sulfenamide - -    148 9 Dibenzothiazyl Disulfide - -      Thiuram chemicals      149 10 Dipentamethylenethiuram Disulfide - -    150 11 Tetraethylthiuram Disulfide (Disulfiram) - -    151 12 Tetramethylthiuram Disulfide - -    152 13 Tetramethyl Thiuram Monosulfide (TMTM) - -      4-Phenylenediamine derivatives      153 14 N-Isopropyl-N'-Phenyl-P-Phenylenediamine (IPPD) - -    154 15 Szxwutdg-S-Gplawvmplhdssljf (DPPD) - -      Various Rubber Additives      155 16 Hydroquinone Monobenzylether  - -    156 17 Hexamethylenetetramine - -    157 18 4,4'-Dihydroxybiphenyl - -    158 19 Cyclohexylthiophtalimide - -    159 20 N-Phenyl-B-Naphthylamine - -    160 21 Dodecyl Mercaptan - -      OTHER PRODUCTS      No Substance Conc  % solv 2 days 4 days remarks   161 1 Benadryl Cream        162 2 Calamine        163 3 Whichhazel        164 4         165 5         166 6         167 7         168 8         169 9         170 10           Results of patch tests:                         Interpretation:    - Negative                    A    = Allergic      (+) Erythema    TI   = Toxic/irritant   + E + Infiltration    RaP = Relevance at Present     ++ E/I + Papulovesicle   Rpr  = Relevance Previously     +++ E/I/P + Blister     nR   = No Relevance        Atopy Screen (Placed 11/03/20 )    No Substance Readings (15 min) Evaluation   POS Histamine 1mg/ml ++    NEG NaCl 0.9% -      No Substance Readings (15 min) Evaluation   1 Alternaria alternata (tenuis)  ++    2 Cladosporium herbarum -    3 Aspergillus fumigatus -    4 Penicillium notatum -    5 Dermatophagoides pteronyssinus -    6 Dermatophagoides farinae -    7 Dog epithelium (canis spp) -    8 Cat hair (marco catus) -    9 Cockroach   (Blatella americana & germanica) -    10 Grass mix Caratunk   (Kellee, Orchard, Redtop,  Francisco Javier) -    11 Jj grass (sorghum halepense) -    12 Weed mix   (common Cocklebur, Lamb s quarters, rough redroot Pigweed, Dock/Sorrel) +    13 Mug wort (artemisia vulgare) +    14 Ragweed giant/short (ambrosia spp) -    15 English Plantain (plantago lanceolata) -    16 Tree mix 1 (Pecan, Maple BHR, Oak RVW, american [place]ton, black [place]) +    17 Red cedar (juniperus virginia) +    18 Tree mix 2   (white Frederic, river/red Birch, black Columbia, common Bulloch, american Elm) -    19 Box elder/Maple mix (acer spp) -    20 [place] shagbark (carya ovata) -       -      Conclusion: patient is atopic with sensitization to Alternaria mold and some pollens (weed/tree)      Intradermal Testing (Placed 11/03/20 )    No Substance Conc.  Readings (15min) Evaluation   - NaCl  0.9% neg    + Histamine (prick) 0.1mg / ml -    DF Standard Dust Mite - D. Farinae 1:10 neg    DP Standard Dust Mite - D. Pteronyssinus 1:10 neg    A Aspergillus fumigatus  1:10 + 7mmP/E   P Penicillium notatum 1:10 neg 5mmP, no E     Conclusion: slight reaction in intradermal test to Aspergillus, but not to dust mites after 15min. After 1 day all reactions gone    [x] No relevant allergic reaction observed    [] Allergic reaction diagnosed against following allergens:      Interpretation/ remarks:   See later    [] Patient information given   [] ACDS CAMP information's (# ....) to following compounds: .....   [] General information's to following compounds: ......      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 with total IgE not elevated)    Maybe  combination of red meat and NSAIDs (Ibuprofen can lead to the angioedemas)    Emergency set: Prednisone 100mg and 360mg Fexofenadine  --> avoid eating red meat and particularly taking NSAIDs at the same time    >> Patient had anaphylactic reaction with generalized Urticaria and breathing problems to bee and wasp    Patient has specific IgE to wasp (0.99), but NOT to bee (neg)    These conclusions are made at the best of one's knowledge and belief based on the provided evidence such as patient's history and allergy test results and they can change over time or can be incomplete because of missing information's.    ==> Treatment prescribed/Plan:    - Doxepin 25mg every evening  - Fexofenadine 180mg every morning   --> avoid red meat  --> if possible do not take Ibuprofen Tabl (can take instead Tylenol if necessary), but write down when taken    >> take care of the atopic skin:    Vanicream cream 2x daily entire body    Free&Clear Shampoo and soap    Mometasone ointment on Saturday and Sunday on the lesions    Protopic ointment 0.1% from Monday to Friday on lesions (was too expensive, never used)    --> if rash gets worse, then consider use of Dupixent s.c.  --> patient will make photo and feed back to us if any delayed reaction to intradermal tests      Blood tests to be tested (involved nurse):  Total IgE, IgE bee, IgE wasp, Tryptase, CBC, HbA1c    Follow-up: in Derm-Allergy clinic for 2nd readings and final conclusions after 4 days (in person)      Thank you for the opportunity be involved in the care of this patient.     Staff: Keesha Garces LPN       I spent a total of 10 minutes face to face with Jaspreet Ridley during today s office visit. Over 50% of this time was spent discussing all the individual test results, correlating them to the clinical relevance, counseling the patient and/or coordinating care. Please see Assessment and Plan for details.

## 2021-01-14 LAB — ALPHA-GAL IGE QN: 0.89 KU/L

## 2021-01-15 ENCOUNTER — OFFICE VISIT (OUTPATIENT)
Dept: ALLERGY | Facility: CLINIC | Age: 66
End: 2021-01-15
Payer: COMMERCIAL

## 2021-01-15 DIAGNOSIS — L50.9 URTICARIA: ICD-10-CM

## 2021-01-15 DIAGNOSIS — L29.9 PRURITIC DISORDER: ICD-10-CM

## 2021-01-15 DIAGNOSIS — L30.9 DERMATITIS, UNSPECIFIED: ICD-10-CM

## 2021-01-15 DIAGNOSIS — L20.89 OTHER ATOPIC DERMATITIS: Primary | ICD-10-CM

## 2021-01-15 DIAGNOSIS — T78.3XXD ANGIOEDEMA, SUBSEQUENT ENCOUNTER: ICD-10-CM

## 2021-01-15 DIAGNOSIS — Z91.018 FOOD ALLERGY: ICD-10-CM

## 2021-01-15 DIAGNOSIS — J30.9 ALLERGIC RHINOCONJUNCTIVITIS: ICD-10-CM

## 2021-01-15 DIAGNOSIS — H10.10 ALLERGIC RHINOCONJUNCTIVITIS: ICD-10-CM

## 2021-01-15 DIAGNOSIS — Z91.030 BEE STING ALLERGY: ICD-10-CM

## 2021-01-15 PROCEDURE — 99215 OFFICE O/P EST HI 40 MIN: CPT | Performed by: DERMATOLOGY

## 2021-01-15 RX ORDER — EMOLLIENT BASE
CREAM (GRAM) TOPICAL 2 TIMES DAILY
Qty: 453 G | Refills: 11 | Status: SHIPPED | OUTPATIENT
Start: 2021-01-15

## 2021-01-15 RX ORDER — DOXEPIN HYDROCHLORIDE 25 MG/1
25 CAPSULE ORAL AT BEDTIME
Qty: 50 CAPSULE | Refills: 3 | Status: SHIPPED | OUTPATIENT
Start: 2021-01-15

## 2021-01-15 RX ORDER — MOMETASONE FUROATE 1 MG/ML
SOLUTION TOPICAL DAILY
Qty: 60 ML | Refills: 1 | Status: SHIPPED | OUTPATIENT
Start: 2021-01-15

## 2021-01-15 RX ORDER — PREDNISONE 50 MG/1
TABLET ORAL
Qty: 2 TABLET | Refills: 3 | Status: SHIPPED | OUTPATIENT
Start: 2021-01-15

## 2021-01-15 RX ORDER — FEXOFENADINE HCL 180 MG/1
180 TABLET ORAL DAILY
Qty: 50 TABLET | Refills: 3 | Status: SHIPPED | OUTPATIENT
Start: 2021-01-15

## 2021-01-15 RX ORDER — MOMETASONE FUROATE 1 MG/G
OINTMENT TOPICAL DAILY
Qty: 45 G | Refills: 4 | Status: SHIPPED | OUTPATIENT
Start: 2021-01-15

## 2021-01-15 ASSESSMENT — PAIN SCALES - GENERAL: PAINLEVEL: NO PAIN (0)

## 2021-01-15 NOTE — NURSING NOTE
Allergy Rooming Note    Jaspreet Ridley's goals for this visit include:   Chief Complaint   Patient presents with     Allergy Testing Followup     Jaspreet is here for patch testing day 5     Keesha Garces LPN

## 2021-01-15 NOTE — LETTER
1/15/2021         RE: Jaspreet Ridley  [address] [place] [address]        Dear Colleague,    Thank you for referring your patient, Jaspreet Ridley, to the [facility] [place]. Please see a copy of my visit note below.    Note for return visit for Dermato-Allergy       Encounter Date: Andrew 15, 2021    History of Present Illness:  Jaspreet Ridley is a 65 year old male who presents in person to the consult following information has been take directly from the prior notes, patients informations, Epic and/or other sources and exam/history performed by myself:     Patient here for 2nd readings and final conclusions after 4 days (in person)      Patient had initially Doxepin and Allegra --> worked well  As soon it runs out the itching comes back. However, still developed eczematous lesions on trunk    Additional comments and observations from review of the patient s chart including the following:    See notes for more details    ROS: Patient generally feeling well today   Physical Examination:  General: Well-appearing, appropriately-developed individual.  - Skin: Focused examination of the test sites within the consultation was performed.   See test results below  Patient has on and off itchy lesions on trunk that evolve to eczematous lesions over days (see photo from Dec 28th 2020). If patient puts on immediately topical steroid, then lesions disappear after 2-3 days.  As above in HPI. No additional skin concerns.  - upper respiratory tract: currently no obvious Rhinitis  - lungs: no signs for active and present Asthma/Wheezing or coughing  - eyes: currently no active conjunctivits  - GI system/digestion: currently no problems, no bloating or Diarrhoea      Earlier History and Allergy exams:    Patient stopped using many different products and stopped using perfumes. Had in December on upper right arm for few days acute eczematous lesions and similar lesions on the chest area and  disappeared after 3-4 days using topical corticosteroids    Patient previously seen for itching and rash. Was to follow up in August 2020 for Prick/Patch testing, but his rash had resolved and so he cancelled the testing. He says that now it has returned again within the last 10 days. He is here today to try and figure out what is going on.     Eats red meat (not major quantities) and no problem    Had a flare up on 06/26 a flare up of a dermatitis on the right upper and lower arm and the lesions seem to appear always on same location and seem to stay longer than 24h and skin stays dry (now today still slight erythema and dryness)    Monday morning after mothers day patient had a reaction (woke up). Went to sleep on Sunday evening (went to sleep around 11pm and woke up about 2h later with sweating and had chest and neck again burning and itching sensations and next morning some on the arm pits.   Had this Sunday 2 eggs for breakfast and bread for brunch and dinner around 7/8pm and ate Chicken Fajitas. No alcohol. No additional pain medications except daily Oxycodon for chronic neck pain.   --> patient did not eat red meat on this day  --> however, patient did not do the treatment with Doxepin or Singulair and took only Allegra in the morning (Compliance?).    Patient has almost every 2nd evening these sweatings and itching attacks and therefore not better (however, compliance not very good)    Previous History of Present Illness:  04/07/2020 virtual consultation:  Middle December 2019 first on head itching and then over the strap of the CEPAP sleep apnoe mask and then over neck and later over the shoulders. First week of February in went to Dermatology --> diagnosed Folliculitis and got antibiotic and topicals. Since then got urticarial lesions all over the upper body.   Patient travelled much for work and beginning of February went to [place].   Patient had a more severe reaction with lip swelling and breathing  problems on 4th February woke up with Urticaria and around 10:30 patient was short of breath (not really angioedema).  Flew then the same evening from [place] to [place]. On the morning of the 5th of February bottom lip all puffy and swollen, not really short of breath. In ER with antihistamines itch went away and steroid injection.  After 6-7 days Urticaria came back and then went back to [facility]. They took there a biopsy and there again steroid injection.     Usually the lesions appear after sweating and they can itch or burn. After 3-4hours the lesions are still there.      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   - history of hay fever and asthma as a child    Current medications:   Tizanidine muscle relaxants since 7 years (Zanaflex) --> stopped 3-4 days and no changes  Oxycodone every day  Metaxolone muscle relaxants  Montelukast from Dr. Yi  Famotidine from Dr. Yi  Vitamine D  Cetirizine 10mg 2xdaily  Diphenhydramine (Benadryl)    No blood pressure medications, no diabetes  Tries to avoid NSAIDs (sometimes Ibuprofen 800mg --> 1-2 times per month)      Allergy History:     Allergies   Allergen Reactions     Alpha-Gal Fatigue, Headache, Hives, Rash and Shortness Of Breath     Bee Pollen Anaphylaxis     Iodine Anaphylaxis     Contrast Dye      Fentanyl Hives     Iodine-131      --> as child  pollen allergies. Still today in fall with ragweed rhinoconjunctivitis  --> as a child Asthma and had Immunotherapy    Social History:  The patient works as a . Patient has the following hobbies or non-occupational exposure     reports that he has quit smoking. He does not have any smokeless tobacco history on file.      Family History:  No family history on file.    Medications:  Current Outpatient Medications   Medication Sig Dispense Refill     cetirizine (ZYRTEC) 10 MG tablet Take 40-50 mg by mouth       diphenhydrAMINE (BENADRYL) 25 MG tablet Take 25-50 mg by mouth       doxepin (SINEQUAN) 25 MG capsule Take 1 capsule (25 mg) by mouth At Bedtime 60 capsule 0     doxepin (SINEQUAN) 25 MG capsule Take 1 capsule (25 mg) by mouth At Bedtime 60 capsule 0     doxepin (SINEQUAN) 25 MG capsule Take 1 capsule (25 mg) by mouth At Bedtime 30 capsule 1     emollient (VANICREAM) external cream Apply topically 2 times daily Free &Clear soap and shampoo 453 g 4     metaxalone (SKELAXIN) 800 MG tablet 800 mg       Misc. Devices (CERVICAL TRACTION) KIT Over the Door Cervical Traction.  May be obtained through [employer] or ordered from the internet.  Traction weight from 8 to 15 pounds.  Not to exceed more than 15 pounds of traction weight.  Begin 10 minutes of traction daily and increase to 15 minutes, two to three times every day. 1 kit 0     mometasone (ELOCON) 0.1 % external ointment On Saturday and Sunday on lesions 45 g 3     montelukast (SINGULAIR) 10 MG tablet Take 1 tablet (10 mg) by mouth At Bedtime 60 tablet 0     montelukast (SINGULAIR) 10 MG tablet Take 10 mg by mouth every evening       oxyCODONE IR (ROXICODONE) 10 MG tablet Take 10 mg by mouth daily       predniSONE (DELTASONE) 50 MG tablet Emergency set: 2 tablet 3     tiZANidine (ZANAFLEX) 4 MG tablet Take 4 mg by mouth At Bedtime       vitamin D2 (ERGOCALCIFEROL) 45351 units (1250 mcg) capsule Take 50,000 Units by mouth          Allergy Tests:    Past Allergy Test: in the 60s = ragweed, dust mites, feathers    Future Allergy Test:   >> plan in future atopy screen prick test and maybe oral provocation test with red meat    Order for PATCH TESTS    [x] Outpatient  [] Inpatient: [facility]..../ Bed ....      Skin Atopy (atopic dermatitis) [x] Yes   [] No since Dec 2019  Rhinitis/Sinusitis:   [x] Yes   [] No as child with positive skin tests and IT as child  Allergic Asthma:   [x] Yes   [] No ? As child  Food Allergy:   [x] Yes   [] No  Leg ulcers:   [] Yes   [] No  Hand eczema:   [] Yes   [] No   Leading hand:   [] R   [] L       [] Ambidextrous                        Reason for tests (suspected allergy): recurrent dermatitis on extremities DDx atopic dermatitis and/or allergic contact dermatitis  Known previous allergies: none    Standardized panels  [x] Standard panel (40 tests)  [x] Preservatives & Antimicrobials (31 tests)  [x] Emulsifiers & Additives (25 tests)   [x] Perfumes/Flavours & Plants (25 tests) wife uses essential oil diffuser  [] Hairdresser panel (12 tests)  [x] Rubber Chemicals (22 tests)  [] Plastics (26 tests)  [] Colorants/Dyes/Food additives (20 tests)  [] Metals (implants/dental) (24 tests)  [] Local anaesthetics/NSAIDs (14 tests)  [] Antibiotics & Antimycotics (14 tests)   [x] Corticosteroids (15 tests)   [] Photopatch test (62 tests)   [] others: ...      [x] Patient's own products: Benadryl cream (make it worse), Calamine    lotion and wichhazel = to do on Friday    DO NOT test if chemical or biological identity is unknown!     always ask from patient the product information and safety sheets (MSDS)   [x] Atopy screen prick test (Atopic predisposition): if possible IDT molds and HDM    RESULTS & EVALUATION of PATCH TESTS    Patch test readings after     [x] 2 days, [] 3 days [x] 4 days, [] 5 days,    Applied patch tests with results (import here the list of patch tests):    STANDARD Series      No Substance 2 days 4  days remarks   1 Neil Mix [C] - -    2 Colophony - -    3  2-Mercaptobenzothiazole  - -     4 Methylisothiazolinone - -    5 Carba Mix - -    6 Thiuram Mix [A] - -    7 Bisphenol A Epoxy Resin - -    8 L-Rnks-Ykvhjybygsq-Formaldehyde Resin - -    9 Mercapto Mix [A] - -    10 Black Rubber Mix- PPD [B] - -    11 Potassium Dichromate  -  -    12 Balsam of [place] (Myroxylon Pereirae Resin) - -    13 Nickel Sulphate Hexahydrate - -    14 Mixed Dialkyl Thiourea - -    15 Paraben Mix [B] - -    16 Methyldibromo Glutaronitrile - -    17 Fragrance Mix - -    18 2-Bromo-2-Nitropropane-1,3-Diol (Bronopol) - -    19 Lyral - -    20 Tixocortol-21- Pivalate - -    21 Diazolidiyl Urea (Germall II) - -    22 Methyl Methacrylate - -    23 Cobalt (II) Chloride Hexahydrate - -    24 Fragrance Mix II  - -    25 Compositae Mix - -    26 Benzoyl Peroxide - -    27 Bacitracin - -    28 Formaldehyde - -    29 Methylchloroisothiazolinone / Methylisothiazolinone - -    30 Corticosteroid Mix - -    31 Sodium Lauryl Sulfate - -    32 Lanolin Alcohol - -    33 Turpentine - -    34 Cetylstearylalcohol - -    35 Chlorhexidine Dicluconate - -    36 Budenoside - -    37 Imidazolidinyl Urea  - -    38 Ethyl-2 Cyanoacrylate - -    39 Quaternium 15 (Dowicil 200) - -    40 Decyl Glucoside - -      CORTICOSTEROIDS   No Substance 2 days 4 days remarks Allergy  class   41 1 Amcinonide - -  B   42 2 Betametasone-17,21 Dipropionate - -  D1   43 3 Desoximetasone - -  C   44 4 Betamethasone-17-Valerate - -  D1   45 5 Dexamethasone - -  C   46 6 Hydrocortisone - -  A   47 7 Clobetasol-17-Propionate - -  D1   48 8 Dexamethasone-21-Phosphate Disodium Salt - -  C   49 9 Hydrocortisone-17 Butyrate - -  D2   50 10 Prednisolone - -  A   51 11 Mometason Furoate - -  D1   52 12 Triamcinolone Acetonide - -  B   53 13 Methylprednisolone Aceponate - -  D2   54 14 Hydrocortisone-21-Acetate - -  A   55 15 Prednicarbate - -  D2     Group Characteristics of group Generic name  Name  cross reactions   A Hydrocortisone   Cloprednole, Fludrocortisone acétate, Hydrocortison acetate, Methylprednisolone, Prednisolone, Tixocortolpivalate Alfacortone, Fucidin H, Dermacalm, Hexacortone, Premandole, Imacort With group D2   B Triamcinolone-acetonide   Budenoside (R-isomer), Amcinonide, Desonide, Fluocinolone acetonide, Triamcinolone acetonide Locapred, Locatop  Synalar, Pevisone, Kenacort -   C Betamethasone (Without Lashon)   Betamethasone, Dexamethasone, Flumethasone pivalate, Halomethasone Daivobet, Dexasalyl, Locasalen,   -   D1 Betamethasone-diproprionate   Betamethasone dipropionate, Betamethasone-17-valerate, Clobetasole-propionate, Fluticasone propionate, Mometasone furoate Betnovate, Diprogenta, Diprosalic, Diprosone, Celestoderm, Fucicort,  Cutivate, Axotide, Elocom -   D2 Methylprednisolone-aceponate   Hydrocortisone-aceponate, Hydrocortisone-buteprate, Hydrocortisone-17-butyrate, Methylprednisolone aceponate, Prednicarbate Locoïd, Advantan,  Prednitop With group A and Budesonide (S-isomer)     EMULSIFIERS & ADDITIVES   No Substance 2 days 4 days remarks   56 1 Polyethylene Glycol-400 - -    57 2 Cocamidopropyl Betaine - -    58 3 Amerchol L101 - -    59 4 Propylene Glycol - -    60 5 Triethanolamine - -    61 6 Sorbitane Sesquiolate - -    62 7 Isopropylmyristate - -    63 8 Polysorbate 80  - -    64 9 Amidoamine   (Stearamidopropyl Dimethylamine) - -    65 10 Oleamidopropyl Dimethylamine - -    66 11 Lauryl Glucoside - -    67 12 Coconut Diethanolamide  - -    68 13 2-Hydroxy-4-Methoxy Benzophenone (Oxybenzone) - -    69 14 Benzophenone-4 (Sulisobenzon) - -    70 15 Propolis - -    71 16 Dexpanthenol - -    72 17 Carboxymethyl Cellulose Sodium - -    73 18 Abitol - -    74 19 Tert-Butylhydroquinone - -    75 20 Benzyl Salicylate - -      Antioxidant      76 21 Dodecyl Gallate - -    77 22 Butylhydroxyanisole (BHA) - -    78 23 Butylhydroxytoluene (BHT) - -    79 24 Di-Alpha-Tocopherol  (Vit E) - -    80 25 Propyl Gallate - -    81 26 Zinc Pyrithione      82 27 Dimethylaminopropylamin (DMAPA)        PRESERVATIVES & ANTIMICROBIALS     No Substance 2 days 4 days remarks   83 1  1,2-Benzisothiazoline-3-One, Sodium Salt - -    84 2  1,3,5-Ross (2-Hydroxyethyl) - Hexahydrotriazine (Grotan BK) - -    85 3 0-Rbcdujnbhmbuo-2-Nitro-1, 3-Propanediol - -    86 4  3, 4, 4' - Triclocarban - -    87 5 4 - Chloro - 3 - Cresol - -    88 6 4 - Chloro - 4 - Xylenol (PCMX) - -    89 7 7-Ethylbicyclooxazolidine (Bioban PC8429) - -    90 8 Benzalkonium Chloride - -    91 9 Benzyl Alcohol - -    92 10 Cetalkonium Chloride - -    93 11 Cetylpyrimidine Chloride  - -    94 12 Chloroacetamide - -    95 13 DMDM Hydantoin - -    96 14 Glutaraldehyde - -    97 15 Triclosan - -    98 16 Glyoxal Trimeric Dihydrate - -    99 17 Iodopropynyl Butylcarbamate - -    100 18 Octylisothiazoline - -    101 19 Iodoform - -    102 20 (Nitrobutyl) Morpholine/(Ethylnitro-Trimethylene) Dimorpholine (Bioban P 1487) - -    103 21 Phenoxyethanol - -    104 22 Phenyl Salicylate - -    105 23 Povidone Iodine - -    106 24 Sodium Benzoate - -    107 25 Sodium Disulfite - -    108 26 Sorbic Acid - -    109 27 Thimerosal - -    110 28 Melamine Formeladyde Resin      111 29 Ethylenediamine Dihydrochloride        Parabens      112 30 Butyl-P-Hydroxybenzoate - -    113 31 Ethyl-P-Hydroxybenzoate - -    114 32 Methyl-P-Hydroxybenzoate - -    115 33 Propyl-P-Hydroxybenzoate - -      PERFUMES, FLAVORS & PLANTS      No Substance 2 days 4 days remarks   116 1 Benzyl Cinnamate - -    117 2 Di-Limonene (Dipentene) - -    118 3 Cananga Odorata (Jacqueline Phillips) (I) - -    119 4 Lichen Acid Mix - -    120 5 Mentha Piperita Oil (Peppermint Oil) - -    121 6 Sesquiterpenelactone mix - -    122 7 Tea Tree Oil, Oxidized - -    123 8 Wood Tar Mix (+) -    124 9 Abietic Acid - -    125 10 Lavendula Angustifolia Oil (Lavender Oil) - -    126 11 Camphor  - -      Fragrance  Mix I      127 12 Oakmoss Absolute - -    128 13 Eugenol - -    129 14 Geraniol - -    130 15 Hydroxycitronellal - -    131 16 Isoeugenol - -    132 17 Cinnamic Aldehyde - -    133 18 Cinnamic Alcohol  - -      Fragrance mix II      134 19 Citronellol - -    135 20 Alpha-Hexylcinnamic Aldehyde    - -    136 21 Citral - -    137 22 Farnesol - -    18 23 Coumarin - -      RUBBER CHEMICALS     No Substance 2 days 4 days remarks   139  Carbamate      140 1 Zinc Bis ( Diethyldithio carbamate ) (ZDEC) - -    141 2 Zinc Bis (Dibutyldithiocarbamate) - -    142 3 1,3-Diphenylguanidine (DPG) - -      Thiourea      143 4 Dibutylthiourea - -    144 5 Diphenyltiourea - -    145 6 Thiourea - -      Mercapto chemicals      146 7 Morpholinyl Mercaptobenzothiazole - -    147 8 F-Zncgdodvag-4-Benzothiazyl-Sulfenamide - -    148 9 Dibenzothiazyl Disulfide - -      Thiuram chemicals      149 10 Dipentamethylenethiuram Disulfide - -    150 11 Tetraethylthiuram Disulfide (Disulfiram) - -    151 12 Tetramethylthiuram Disulfide - -    152 13 Tetramethyl Thiuram Monosulfide (TMTM) - -      4-Phenylenediamine derivatives      153 14 N-Isopropyl-N'-Phenyl-P-Phenylenediamine (IPPD) - -    154 15 Jspbbfvd-U-Fgcfyomfwrfizqte (DPPD) - -      Various Rubber Additives      155 16 Hydroquinone Monobenzylether  - -    156 17 Hexamethylenetetramine - -    157 18 4,4'-Dihydroxybiphenyl - -    158 19 Cyclohexylthiophtalimide - -    159 20 N-Phenyl-B-Naphthylamine - -    160 21 Dodecyl Mercaptan - -      OTHER PRODUCTS (put on 1/15/2021)      No Substance Conc  % solv 2 days 4 days remarks   161 1 Benadryl Cream   - -    162 2 Calamine   - -    163 3 Whichhazel   - -    164 4         165 5         166 6         167 7         168 8         169 9         170 10           Results of patch tests:                         Interpretation:    - Negative                    A    = Allergic      (+) Erythema    TI   = Toxic/irritant   + E + Infiltration    RaP =  Relevance at Present     ++ E/I + Papulovesicle   Rpr  = Relevance Previously     +++ E/I/P + Blister     nR   = No Relevance      Atopy Screen (Placed 11/03/20 )    No Substance Readings (15 min) Evaluation   POS Histamine 1mg/ml ++    NEG NaCl 0.9% -      No Substance Readings (15 min) Evaluation   1 Alternaria alternata (tenuis)  ++    2 Cladosporium herbarum -    3 Aspergillus fumigatus -    4 Penicillium notatum -    5 Dermatophagoides pteronyssinus -    6 Dermatophagoides farinae -    7 Dog epithelium (canis spp) -    8 Cat hair (marco catus) -    9 Cockroach   (Blatella americana & germanica) -    10 Grass mix midwest   (Kellee, Orchard, Redtop, Francisco Javier) -    11 Jj grass (sorghum halepense) -    12 Weed mix   (common Cocklebur, Lamb s quarters, rough redroot Pigweed, Dock/Sorrel) +    13 Mug wort (artemisia vulgare) +    14 Ragweed giant/short (ambrosia spp) -    15 English Plantain (plantago lanceolata) -    16 Tree mix 1 (Pecan, Maple BHR, Oak RVW, american Arena, black Lecanto) +    17 Red cedar (juniperus virginia) +    18 Tree mix 2   (white Frederic, river/red Birch, black Pindall, common Keith, american Elm) -    19 Box elder/Maple mix (acer spp) -    20 Kaleva shagbark (carya ovata) -       -      Conclusion: patient is atopic with sensitization to Alternaria mold and some pollens (weed/tree)      Intradermal Testing (Placed 11/03/20 )    No Substance Conc.  Readings (15min) Evaluation   - NaCl  0.9% neg    + Histamine (prick) 0.1mg / ml -    DF Standard Dust Mite - D. Farinae 1:10 neg    DP Standard Dust Mite - D. Pteronyssinus 1:10 neg    A Aspergillus fumigatus  1:10 + 7mmP/E   P Penicillium notatum 1:10 neg 5mmP, no E     Conclusion: slight reaction in intradermal test to Aspergillus, but not to dust mites after 15min. After 1 day all reactions gone    [x] No relevant allergic reaction observed      Interpretation/ remarks:   In the patch tests no signs for relevant contact sensitizitions.  But in pictures clear localized eczematous flare-up and in prick tests positive to the mold alternaria, which means an atopy.  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 3.19 and Jan 2021 0.89) with total IgE not elevated)    Maybe combination of red meat and NSAIDs (Ibuprofen can lead to the angioedemas)    Emergency set: Prednisone 100mg and 360mg Fexofenadine  --> avoid eating red meat and particularly taking NSAIDs at the same time    >> Patient had anaphylactic reaction with generalized Urticaria and breathing problems to bee and wasp    Patient has specific IgE to wasp (0.99), but NOT to bee (neg)    These conclusions are made at the best of one's knowledge and belief based on the provided evidence such as patient's history and allergy test results and they can change over time or can be incomplete because of missing information's.    ==> Treatment prescribed/Plan:    - Doxepin 25mg every evening pren  - Fexofenadine 180mg every morning prn    >> take care of the atopic skin:     continue hydration of skin with Vanicream cream 2xdaily regularly    Free&Clear Shampoo and soap    if new lesions use right away for 2-4 days daily Mometasone ointment and for scalp Mometasone soln     Protopic ointment 0.1% was too expensive!    --> if eczema gets more active again and topical treatments are not sufficient, then we can consider use of Dupixent (Dupilumab) injections    Try for sleeping Valerian drops and Melatonin    Follow-up: in  Derm-Allergy clinic if necessary      Thank you for the opportunity be involved in the care of this patient.     Staff: Keesha Garces LPN and Donna Bose RN      I spent a total of 45 minutes face to face with Jaspreet Ridley during today s office visit. Over 50% of this time was spent discussing all the individual test results, correlating them to the clinical relevance, counseling the patient and/or coordinating care. Please see Assessment and Plan for details.        Again, thank you for allowing me to participate in the care of your patient.        Sincerely,        Jonas Garza MD

## 2021-01-15 NOTE — PROGRESS NOTES
Note for return visit for Dermato-Allergy       Encounter Date: Andrew 15, 2021    History of Present Illness:  Jaspreet Ridley is a 65 year old male who presents in person to the consult following information has been take directly from the prior notes, patients informations, Epic and/or other sources and exam/history performed by myself:     Patient here for 2nd readings and final conclusions after 4 days (in person)      Patient had initially Doxepin and Allegra --> worked well  As soon it runs out the itching comes back. However, still developed eczematous lesions on trunk    Additional comments and observations from review of the patient s chart including the following:    See notes for more details    ROS: Patient generally feeling well today   Physical Examination:  General: Well-appearing, appropriately-developed individual.  - Skin: Focused examination of the test sites within the consultation was performed.   See test results below  Patient has on and off itchy lesions on trunk that evolve to eczematous lesions over days (see photo from Dec 28th 2020). If patient puts on immediately topical steroid, then lesions disappear after 2-3 days.  As above in HPI. No additional skin concerns.  - upper respiratory tract: currently no obvious Rhinitis  - lungs: no signs for active and present Asthma/Wheezing or coughing  - eyes: currently no active conjunctivits  - GI system/digestion: currently no problems, no bloating or Diarrhoea      Earlier History and Allergy exams:    Patient stopped using many different products and stopped using perfumes. Had in December on upper right arm for few days acute eczematous lesions and similar lesions on the chest area and disappeared after 3-4 days using topical corticosteroids    Patient previously seen for itching and rash. Was to follow up in August 2020 for Prick/Patch testing, but his rash had resolved and so he cancelled the testing. He says that now it has returned again  within the last 10 days. He is here today to try and figure out what is going on.     Eats red meat (not major quantities) and no problem    Had a flare up on 06/26 a flare up of a dermatitis on the right upper and lower arm and the lesions seem to appear always on same location and seem to stay longer than 24h and skin stays dry (now today still slight erythema and dryness)    Monday morning after mothers day patient had a reaction (woke up). Went to sleep on Sunday evening (went to sleep around 11pm and woke up about 2h later with sweating and had chest and neck again burning and itching sensations and next morning some on the arm pits.   Had this Sunday 2 eggs for breakfast and bread for brunch and dinner around 7/8pm and ate Chicken Fajitas. No alcohol. No additional pain medications except daily Oxycodon for chronic neck pain.   --> patient did not eat red meat on this day  --> however, patient did not do the treatment with Doxepin or Singulair and took only Allegra in the morning (Compliance?).    Patient has almost every 2nd evening these sweatings and itching attacks and therefore not better (however, compliance not very good)    Previous History of Present Illness:  04/07/2020 virtual consultation:  Middle December 2019 first on head itching and then over the strap of the CEPAP sleep apnoe mask and then over neck and later over the shoulders. First week of February in went to Dermatology --> diagnosed Folliculitis and got antibiotic and topicals. Since then got urticarial lesions all over the upper body.   Patient travelled much for work and beginning of February went to [place].   Patient had a more severe reaction with lip swelling and breathing problems on 4th February woke up with Urticaria and around 10:30 patient was short of breath (not really angioedema).  Flew then the same evening from [place] to [place]. On the morning of the 5th of February bottom lip all puffy and swollen, not really short of  breath. In ER with antihistamines itch went away and steroid injection.  After 6-7 days Urticaria came back and then went back to [facility]. They took there a biopsy and there again steroid injection.     Usually the lesions appear after sweating and they can itch or burn. After 3-4hours the lesions are still there.      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   - history of hay fever and asthma as a child    Current medications:   Tizanidine muscle relaxants since 7 years (Zanaflex) --> stopped 3-4 days and no changes  Oxycodone every day  Metaxolone muscle relaxants  Montelukast from Dr. Yi  Famotidine from Dr. Yi  Vitamine D  Cetirizine 10mg 2xdaily  Diphenhydramine (Benadryl)    No blood pressure medications, no diabetes  Tries to avoid NSAIDs (sometimes Ibuprofen 800mg --> 1-2 times per month)      Allergy History:     Allergies   Allergen Reactions     Alpha-Gal Fatigue, Headache, Hives, Rash and Shortness Of Breath     Bee Pollen Anaphylaxis     Iodine Anaphylaxis     Contrast Dye      Fentanyl Hives     Iodine-131      --> as child pollen allergies. Still today in fall with ragweed rhinoconjunctivitis  --> as a child Asthma and had Immunotherapy    Social History:  The patient works as a . Patient has the following hobbies or non-occupational exposure     reports that he has quit  smoking. He does not have any smokeless tobacco history on file.      Family History:  No family history on file.    Medications:  Current Outpatient Medications   Medication Sig Dispense Refill     cetirizine (ZYRTEC) 10 MG tablet Take 40-50 mg by mouth       diphenhydrAMINE (BENADRYL) 25 MG tablet Take 25-50 mg by mouth       doxepin (SINEQUAN) 25 MG capsule Take 1 capsule (25 mg) by mouth At Bedtime 60 capsule 0     doxepin (SINEQUAN) 25 MG capsule Take 1 capsule (25 mg) by mouth At Bedtime 60 capsule 0     doxepin (SINEQUAN) 25 MG capsule Take 1 capsule (25 mg) by mouth At Bedtime 30 capsule 1     emollient (VANICREAM) external cream Apply topically 2 times daily Free &Clear soap and shampoo 453 g 4     metaxalone (SKELAXIN) 800 MG tablet 800 mg       Misc. Devices (CERVICAL TRACTION) KIT Over the Door Cervical Traction.  May be obtained through [facility] [employer] [facility] or ordered from the internet.  Traction weight from 8 to 15 pounds.  Not to exceed more than 15 pounds of traction weight.  Begin 10 minutes of traction daily and increase to 15 minutes, two to three times every day. 1 kit 0     mometasone (ELOCON) 0.1 % external ointment On Saturday and Sunday on lesions 45 g 3     montelukast (SINGULAIR) 10 MG tablet Take 1 tablet (10 mg) by mouth At Bedtime 60 tablet 0     montelukast (SINGULAIR) 10 MG tablet Take 10 mg by mouth every evening       oxyCODONE IR (ROXICODONE) 10 MG tablet Take 10 mg by mouth daily       predniSONE (DELTASONE) 50 MG tablet Emergency set: 2 tablet 3     tiZANidine (ZANAFLEX) 4 MG tablet Take 4 mg by mouth At Bedtime       vitamin D2 (ERGOCALCIFEROL) 66087 units (1250 mcg) capsule Take 50,000 Units by mouth         Allergy Tests:    Past Allergy Test: in the 60s = ragweed, dust mites, feathers    Future Allergy Test:   >> plan in future atopy screen prick test and maybe oral provocation test with red meat    Order for PATCH TESTS    [x] Outpatient  [] Inpatient: [facility]..../  Bed ....      Skin Atopy (atopic dermatitis) [x] Yes   [] No since Dec 2019  Rhinitis/Sinusitis:   [x] Yes   [] No as child with positive skin tests and IT as child  Allergic Asthma:   [x] Yes   [] No ? As child  Food Allergy:   [x] Yes   [] No  Leg ulcers:   [] Yes   [] No  Hand eczema:   [] Yes   [] No   Leading hand:   [] R   [] L       [] Ambidextrous                        Reason for tests (suspected allergy): recurrent dermatitis on extremities DDx atopic dermatitis and/or allergic contact dermatitis  Known previous allergies: none    Standardized panels  [x] Standard panel (40 tests)  [x] Preservatives & Antimicrobials (31 tests)  [x] Emulsifiers & Additives (25 tests)   [x] Perfumes/Flavours & Plants (25 tests) wife uses essential oil diffuser  [] Hairdresser panel (12 tests)  [x] Rubber Chemicals (22 tests)  [] Plastics (26 tests)  [] Colorants/Dyes/Food additives (20 tests)  [] Metals (implants/dental) (24 tests)  [] Local anaesthetics/NSAIDs (14 tests)  [] Antibiotics & Antimycotics (14 tests)   [x] Corticosteroids (15 tests)   [] Photopatch test (62 tests)   [] others: ...      [x] Patient's own products: Benadryl cream (make it worse), Calamine    lotion and wichhazel = to do on Friday    DO NOT test if chemical or biological identity is unknown!     always ask from patient the product information and safety sheets (MSDS)   [x] Atopy screen prick test (Atopic predisposition): if possible IDT molds and HDM    RESULTS & EVALUATION of PATCH TESTS    Patch test readings after     [x] 2 days, [] 3 days [x] 4 days, [] 5 days,    Applied patch tests with results (import here the list of patch tests):    STANDARD Series      No Substance 2 days 4 days remarks   1 Neil Mix [C] - -    2 Colophony - -    3  2-Mercaptobenzothiazole  - -     4 Methylisothiazolinone - -    5 Carba Mix - -    6 Thiuram Mix [A] - -    7 Bisphenol A Epoxy Resin - -    8 N-Thee-Zomuhjxycfq-Formaldehyde Resin - -    9 Mercapto Mix [A] -  -    10 Black Rubber Mix- PPD [B] - -    11 Potassium Dichromate  -  -    12 Balsam of [place] (Myroxylon Pereirae Resin) - -    13 Nickel Sulphate Hexahydrate - -    14 Mixed Dialkyl Thiourea - -    15 Paraben Mix [B] - -    16 Methyldibromo Glutaronitrile - -    17 Fragrance Mix - -    18 2-Bromo-2-Nitropropane-1,3-Diol (Bronopol) - -    19 Lyral - -    20 Tixocortol-21- Pivalate - -    21 Diazolidiyl Urea (Germall II) - -    22 Methyl Methacrylate - -    23 Cobalt (II) Chloride Hexahydrate - -    24 Fragrance Mix II  - -    25 Compositae Mix - -    26 Benzoyl Peroxide - -    27 Bacitracin - -    28 Formaldehyde - -    29 Methylchloroisothiazolinone / Methylisothiazolinone - -    30 Corticosteroid Mix - -    31 Sodium Lauryl Sulfate - -    32 Lanolin Alcohol - -    33 Turpentine - -    34 Cetylstearylalcohol - -    35 Chlorhexidine Dicluconate - -    36 Budenoside - -    37 Imidazolidinyl Urea  - -    38 Ethyl-2 Cyanoacrylate - -    39 Quaternium 15 (Dowicil 200) - -    40 Decyl Glucoside - -      CORTICOSTEROIDS   No Substance 2 days 4 days remarks Allergy  class   41 1 Amcinonide - -  B   42 2 Betametasone-17,21 Dipropionate - -  D1   43 3 Desoximetasone - -  C   44 4 Betamethasone-17-Valerate - -  D1   45 5 Dexamethasone - -  C   46 6 Hydrocortisone - -  A   47 7 Clobetasol-17-Propionate - -  D1   48 8 Dexamethasone-21-Phosphate Disodium Salt - -  C   49 9 Hydrocortisone-17 Butyrate - -  D2   50 10 Prednisolone - -  A   51 11 Mometason Furoate - -  D1   52 12 Triamcinolone Acetonide - -  B   53 13 Methylprednisolone Aceponate - -  D2   54 14 Hydrocortisone-21-Acetate - -  A   55 15 Prednicarbate - -  D2     Group Characteristics of group Generic name Name  cross reactions   A Hydrocortisone   Cloprednole, Fludrocortisone acétate, Hydrocortison acetate, Methylprednisolone, Prednisolone, Tixocortolpivalate Alfacortone, Fucidin H, Dermacalm, Hexacortone, Premandole, Imacort With group D2   B Triamcinolone-acetonide    Budenoside (R-isomer), Amcinonide, Desonide, Fluocinolone acetonide, Triamcinolone acetonide Locapred, Locatop  Synalar, Pevisone, Ken[place]ort -   C Betamethasone (Without Lashon)   Betamethasone, Dexamethasone, Flumethasone pivalate, Halomethasone Daivobet, Dexasalyl, Locasalen,   -   D1 Betamethasone-diproprionate   Betamethasone dipropionate, Betamethasone-17-valerate, Clobetasole-propionate, Fluticasone propionate, Mometasone furoate Betnovate, Diprogenta, Diprosalic, Diprosone, Celestoderm, Fucicort,  Cutivate, Axotide, Elocom -   D2 Methylprednisolone-aceponate   Hydrocortisone-aceponate, Hydrocortisone-buteprate, Hydrocortisone-17-butyrate, Methylprednisolone aceponate, Prednicarbate Locoïd, Advantan,  Prednitop With group A and Budesonide (S-isomer)     EMULSIFIERS & ADDITIVES   No Substance 2 days 4 days remarks   56 1 Polyethylene Glycol-400 - -    57 2 Cocamidopropyl Betaine - -    58 3 Amerchol L101 - -    59 4 Propylene Glycol - -    60 5 Triethanolamine - -    61 6 Sorbitane Sesquiolate - -    62 7 Isopropylmyristate - -    63 8 Polysorbate 80  - -    64 9 Amidoamine   (Stearamidopropyl Dimethylamine) - -    65 10 Oleamidopropyl Dimethylamine - -    66 11 Lauryl Glucoside - -    67 12 Coconut Diethanolamide  - -    68 13 2-Hydroxy-4-Methoxy Benzophenone (Oxybenzone) - -    69 14 Benzophenone-4 (Sulisobenzon) - -    70 15 Propolis - -    71 16 Dexpanthenol - -    72 17 Carboxymethyl Cellulose Sodium - -    73 18 Abitol - -    74 19 Tert-Butylhydroquinone - -    75 20 Benzyl Salicylate - -      Antioxidant      76 21 Dodecyl Gallate - -    77 22 Butylhydroxyanisole (BHA) - -    78 23 Butylhydroxytoluene (BHT) - -    79 24 Di-Alpha-Tocopherol (Vit E) - -    80 25 Propyl Gallate - -    81 26 Zinc Pyrithione      82 27 Dimethylaminopropylamin (DMAPA)        PRESERVATIVES & ANTIMICROBIALS     No Substance 2 days 4 days remarks   83 1  1,2-Benzisothiazoline-3-One, Sodium Salt - -    84 2  1,3,5-Ross  (2-Hydroxyethyl) - Hexahydrotriazine (Grotan BK) - -    85 3 0-Ulllmbjimcvzy-1-Nitro-1, 3-Propanediol - -    86 4  3, 4, 4' - Triclocarban - -    87 5 4 - Chloro - 3 - Cresol - -    88 6 4 - Chloro - 4 - Xylenol (PCMX) - -    89 7 7-Ethylbicyclooxazolidine (Bioban [id number]) - -    90 8 Benzalkonium Chloride - -    91 9 Benzyl Alcohol - -    92 10 Cetalkonium Chloride - -    93 11 Cetylpyrimidine Chloride  - -    94 12 Chloroacetamide - -    95 13 DMDM Hydantoin - -    96 14 Glutaraldehyde - -    97 15 Triclosan - -    98 16 Glyoxal Trimeric Dihydrate - -    99 17 Iodopropynyl Butylcarbamate - -    100 18 Octylisothiazoline - -    101 19 Iodoform - -    102 20 (Nitrobutyl) Morpholine/(Ethylnitro-Trimethylene) Dimorpholine (Bioban P 1487) - -    103 21 Phenoxyethanol - -    104 22 Phenyl Salicylate - -    105 23 Povidone Iodine - -    106 24 Sodium Benzoate - -    107 25 Sodium Disulfite - -    108 26 Sorbic Acid - -    109 27 Thimerosal - -    110 28 Melamine Formeladyde Resin      111 29 Ethylenediamine Dihydrochloride        Parabens      112 30 Butyl-P-Hydroxybenzoate - -    113 31 Ethyl-P-Hydroxybenzoate - -    114 32 Methyl-P-Hydroxybenzoate - -    115 33 Propyl-P-Hydroxybenzoate - -      PERFUMES, FLAVORS & PLANTS      No Substance 2 days 4 days remarks   116 1 Benzyl Cinnamate - -    117 2 Di-Limonene (Dipentene) - -    118 3 Cananga Odorata (Ylang Ylang) (I) - -    119 4 Lichen Acid Mix - -    120 5 Mentha Piperita Oil (Peppermint Oil) - -    121 6 Sesquiterpenelactone mix - -    122 7 Tea Tree Oil, Oxidized - -    123 8 Wood Tar Mix (+) -    124 9 Abietic Acid - -    125 10 Lavendula Angustifolia Oil (Lavender Oil) - -    126 11 Camphor  - -      Fragrance Mix I      127 12 [place]moss Absolute - -    128 13 Eugenol - -    129 14 Geraniol - -    130 15 Hydroxycitronellal - -    131 16 Isoeugenol - -    132 17 Cinnamic Aldehyde - -    133 18 Cinnamic Alcohol  - -      Fragrance mix II      134 19 Citronellol - -     135 20 Alpha-Hexylcinnamic Aldehyde    - -    136 21 Citral - -    137 22 Farnesol - -    18 23 Coumarin - -      RUBBER CHEMICALS     No Substance 2 days 4 days remarks   139  Carbamate      140 1 Zinc Bis ( Diethyldithio carbamate ) (ZDEC) - -    141 2 Zinc Bis (Dibutyldithiocarbamate) - -    142 3 1,3-Diphenylguanidine (DPG) - -      Thiourea      143 4 Dibutylthiourea - -    144 5 Diphenyltiourea - -    145 6 Thiourea - -      Mercapto chemicals      146 7 Morpholinyl Mercaptobenzothiazole - -    147 8 H-Qsjnltqair-4-Benzothiazyl-Sulfenamide - -    148 9 Dibenzothiazyl Disulfide - -      Thiuram chemicals      149 10 Dipentamethylenethiuram Disulfide - -    150 11 Tetraethylthiuram Disulfide (Disulfiram) - -    151 12 Tetramethylthiuram Disulfide - -    152 13 Tetramethyl Thiuram Monosulfide (TMTM) - -      4-Phenylenediamine derivatives      153 14 N-Isopropyl-N'-Phenyl-P-Phenylenediamine (IPPD) - -    154 15 Swjcqsqd-B-Eismzxigmvvdvkif (DPPD) - -      Various Rubber Additives      155 16 Hydroquinone Monobenzylether  - -    156 17 Hexamethylenetetramine - -    157 18 4,4'-Dihydroxybiphenyl - -    158 19 Cyclohexylthiophtalimide - -    159 20 N-Phenyl-B-Naphthylamine - -    160 21 Dodecyl Mercaptan - -      OTHER PRODUCTS (put on 1/15/2021)      No Substance Conc  % solv 2 days 4 days remarks   161 1 Benadryl Cream   - -    162 2 Calamine   - -    163 3 Whichhazel   - -    164 4         165 5         166 6         167 7         168 8         169 9         170 10           Results of patch tests:                         Interpretation:    - Negative                    A    = Allergic      (+) Erythema    TI   = Toxic/irritant   + E + Infiltration    RaP = Relevance at Present     ++ E/I + Papulovesicle   Rpr  = Relevance Previously     +++ E/I/P + Blister     nR   = No Relevance      Atopy Screen (Placed 11/03/20 )    No Substance Readings (15 min) Evaluation   POS Histamine 1mg/ml ++    NEG NaCl 0.9% -       No Substance Readings (15 min) Evaluation   1 Alternaria alternata (tenuis)  ++    2 Cladosporium herbarum -    3 Aspergillus fumigatus -    4 Penicillium notatum -    5 Dermatophagoides pteronyssinus -    6 Dermatophagoides farinae -    7 Dog epithelium (canis spp) -    8 Cat hair (marco catus) -    9 Cockroach   (Blatella americana & germanica) -    10 Grass mix midwest   (Kellee, Orchard, Redtop, Francisco Javier) -    11 Jj grass (sorghum halepense) -    12 Weed mix   (common Cocklebur, Lamb s quarters, rough redroot Pigweed, Dock/Sorrel) +    13 Mug wort (artemisia vulgare) +    14 Ragweed giant/short (ambrosia spp) -    15 English Plantain (plantago lanceolata) -    16 Tree mix 1 (Pecan, Maple BHR, Oak RVW, american [place], black [place]) +    17 Red cedar (juniperus virginia) +    18 Tree mix 2   (white Frederic, river/red Birch, black Alloy, common [place], american Elm) -    19 Box elder/Maple mix (acer spp) -    20 Yucca shagbark (carya ovata) -       -      Conclusion: patient is atopic with sensitization to Alternaria mold and some pollens (weed/tree)      Intradermal Testing (Placed 11/03/20 )    No Substance Conc.  Readings (15min) Evaluation   - NaCl  0.9% neg    + Histamine (prick) 0.1mg / ml -    DF Standard Dust Mite - D. Farinae 1:10 neg    DP Standard Dust Mite - D. Pteronyssinus 1:10 neg    A Aspergillus fumigatus  1:10 + 7mmP/E   P Penicillium notatum 1:10 neg 5mmP, no E     Conclusion: slight reaction in intradermal test to Aspergillus, but not to dust mites after 15min. After 1 day all reactions gone    [x] No relevant allergic reaction observed      Interpretation/ remarks:   In the patch tests no signs for relevant contact sensitizitions. But in pictures clear localized eczematous flare-up and in prick tests positive to the mold alternaria, which means an atopy.  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 3.19 and Jan 2021 0.89) with total IgE not elevated)    Maybe combination of red meat and NSAIDs (Ibuprofen can lead to the angioedemas)    Emergency set: Prednisone 100mg and 360mg Fexofenadine  --> avoid eating red meat and particularly taking NSAIDs at the same time    >> Patient had anaphylactic reaction with generalized Urticaria and breathing problems to bee and wasp    Patient has specific IgE to wasp (0.99), but NOT to bee (neg)    These conclusions are made at the best of one's knowledge and belief based on the provided evidence such as patient's history and allergy test results and they can change over time or can be incomplete because of missing information's.    ==> Treatment prescribed/Plan:    - Doxepin 25mg every evening pren  - Fexofenadine 180mg every morning prn    >> take care of the atopic skin:     continue hydration of skin with Vanicream cream 2xdaily regularly    Free&Clear Shampoo and soap    if new lesions use right away for 2-4 days daily Mometasone ointment and for scalp Mometasone soln     Protopic ointment 0.1% was too expensive!    --> if eczema gets more active again and topical treatments are not sufficient, then we can consider use of Dupixent (Dupilumab) injections    Try for sleeping Valerian drops and Melatonin    Follow-up: in Derm-Allergy clinic if necessary      Thank you for the opportunity be involved in the care of this patient.     Staff: Keesha Garces LPN and Donna Bose RN      I spent a total of 45 minutes face to face with Jaspreet Ridley during today s  office visit. Over 50% of this time was spent discussing all the individual test results, correlating them to the clinical relevance, counseling the patient and/or coordinating care. Please see Assessment and Plan for details.

## 2021-05-08 ENCOUNTER — HEALTH MAINTENANCE LETTER (OUTPATIENT)
Age: 66
End: 2021-05-08

## 2021-08-28 ENCOUNTER — HEALTH MAINTENANCE LETTER (OUTPATIENT)
Age: 66
End: 2021-08-28

## 2021-10-23 ENCOUNTER — HEALTH MAINTENANCE LETTER (OUTPATIENT)
Age: 66
End: 2021-10-23

## 2021-12-18 ENCOUNTER — HEALTH MAINTENANCE LETTER (OUTPATIENT)
Age: 66
End: 2021-12-18

## 2022-01-02 NOTE — PROGRESS NOTES
[facility] Dermato-allergology Note  Virtual visit: store and forward phone call (Doximity did not work), start time: 4:41pm, end time: 5:10pm, date of images: na  Encounter Date: Andrew 3, 2022  ____________________________________________    CC: No chief complaint on file.      HPI:  (01/02/22)  Mr. 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 Since then broke out on chest  - since 15years however, no headaches anymore.  - patient is now eating meat without problems  - Medications: Trazadone every evening entire year, takes occasionally an Ibuprofen (last one 3 months ago)    Physical exam:  General: In no acute distress, well-developed, well-nourished  Eyes: no conjunctivitis  ENT: no signs of rhinitis   Pulmonary: no wheezing or coughing  Skin:no active lesions in the moment    Earlier History and Allergy exams:  01/15/2021  Patient here for 2nd readings and final conclusions after 4 days (in person)    Patient had initially Doxepin and Allegra --> worked well  As soon it runs out the itching comes back. However, still developed eczematous lesions on trunk      Earlier History and Allergy exams:  Patient stopped using many different products and stopped using perfumes. Had in December on upper right arm for few days acute eczematous lesions and similar lesions on the chest area and disappeared after  3-4 days using topical corticosteroids    Patient previously seen for itching and rash. Was to follow up in August 2020 for Prick/Patch testing, but his rash had resolved and so he cancelled the testing. He says that now it has returned again within the last 10 days. He is here today to try and figure out what is going on.     Eats red meat (not major quantities) and no problem    Had a flare up on 06/26 a flare up of a dermatitis on the right upper and lower arm and the lesions seem to appear always on same location and seem to stay longer than 24h and skin stays dry (now today still slight erythema and dryness)    Monday morning after mothers day patient had a reaction (woke up). Went to sleep on Sunday evening (went to sleep around 11pm and woke up about 2h later with sweating and had chest and neck again burning and itching sensations and next morning some on the arm pits.   Had this Sunday 2 eggs for breakfast and bread for brunch and dinner around 7/8pm and ate Chicken Fajitas. No alcohol. No additional pain medications except daily Oxycodon for chronic neck pain.   --> patient did not eat red meat on this day  --> however, patient did not do the treatment with Doxepin or Singulair and took only Allegra in the morning (Compliance?).    Patient has almost every 2nd evening these sweatings and itching attacks and therefore not better (however, compliance not very good)    Previous History of Present Illness:  04/07/2020 virtual consultation:  Middle December 2019 first on head itching and then over the strap of the CEPAP sleep apnoe mask and then over neck and later over the shoulders. First week of February in went to Dermatology --> diagnosed Folliculitis and got antibiotic and topicals. Since then got urticarial lesions all over the upper body.   Patient travelled much for work and beginning of February went to [place].   Patient had a more severe reaction with lip swelling and breathing problems on 4th  February woke up with Urticaria and around 10:30 patient was short of breath (not really angioedema).  Flew then the same evening from [place] to [place]. On the morning of the 5th of February bottom lip all puffy and swollen, not really short of breath. In ER with antihistamines itch went away and steroid injection.  After 6-7 days Urticaria came back and then went back to [facility]. They took there a biopsy and there again steroid injection.     Usually the lesions appear after sweating and they can itch or burn. After 3-4hours the lesions are still there.      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   - history of hay fever and asthma as a child    Current medications:   Tizanidine muscle relaxants since 7 years (Zanaflex) --> stopped 3-4 days and no changes  Oxycodone every day  Metaxolone muscle relaxants  Montelukast from Dr. Yi  Famotidine from Dr. Yi  Vitamine D  Cetirizine 10mg 2xdaily  Diphenhydramine (Benadryl)    No blood pressure medications, no diabetes  Tries to avoid NSAIDs (sometimes Ibuprofen 800mg --> 1-2 times per month)      Allergy History:     Allergies   Allergen Reactions     Alpha-Gal Fatigue, Headache, Hives, Rash and Shortness Of Breath     Bee Pollen Anaphylaxis     Iodine Anaphylaxis     Contrast Dye      Fentanyl Hives     Iodine-131      --> as child pollen allergies.  Still today in fall with ragweed rhinoconjunctivitis  --> as a child Asthma and had Immunotherapy    Social History:  The patient works as a . Patient has the following hobbies or non-occupational exposure     reports that he has quit smoking. He does not have any smokeless tobacco history on file.      Family History:  No family history on file.    Medications:  Current Outpatient Medications   Medication Sig Dispense Refill     cetirizine (ZYRTEC) 10 MG tablet Take 40-50 mg by mouth       diphenhydrAMINE (BENADRYL) 25 MG tablet Take 25-50 mg by mouth       doxepin (SINEQUAN) 25 MG capsule Take 1 capsule (25 mg) by mouth At Bedtime 50 capsule 3     doxepin (SINEQUAN) 25 MG capsule Take 1 capsule (25 mg) by mouth At Bedtime 60 capsule 0     emollient (VANICREAM) external cream Apply topically 2 times daily 453 g 11     emollient (VANICREAM) external cream Apply topically 2 times daily Free &Clear soap and shampoo 453 g 4     fexofenadine (ALLEGRA) 180 MG tablet Take 1 tablet (180 mg) by mouth daily 50 tablet 3     metaxalone (SKELAXIN) 800 MG tablet 800 mg       Misc. Devices (CERVICAL TRACTION) KIT Over the Door Cervical Traction.  May be obtained through [employer] [facility] [employer] or ordered from the internet.  Traction weight from 8 to 15 pounds.  Not to exceed more than 15 pounds of traction weight.  Begin 10 minutes of traction daily and increase to 15 minutes, two to three times every day. 1 kit 0     mometasone (ELOCON) 0.1 % external ointment Apply topically daily For 3-4 days on acute eczematous lesions 45 g 4     mometasone (ELOCON) 0.1 % external ointment On Saturday and Sunday on lesions 45 g 3     mometasone (ELOCON) 0.1 % external solution Apply topically daily 2-3 times weekly on itchy lesions in scalp and beard reagion 60 mL 1     montelukast (SINGULAIR) 10 MG tablet Take 10 mg by mouth every evening       oxyCODONE IR (ROXICODONE) 10 MG tablet Take 10 mg by mouth daily        predniSONE (DELTASONE) 50 MG tablet Emergency set: 2 tablet 3     tiZANidine (ZANAFLEX) 4 MG tablet Take 4 mg by mouth At Bedtime       vitamin D2 (ERGOCALCIFEROL) 80309 units (1250 mcg) capsule Take 50,000 Units by mouth         Allergy Tests:    Past Allergy Test: in the 60s = ragweed, dust mites, feathers    Future Allergy Test:   >> plan in future atopy screen prick test and maybe oral provocation test with red meat    Order for PATCH TESTS    [x] Outpatient  [] Inpatient: [facility]..../ Bed ....      Skin Atopy (atopic dermatitis) [x] Yes   [] No since Dec 2019  Rhinitis/Sinusitis:   [x] Yes   [] No as child with positive skin tests and IT as child  Allergic Asthma:   [x] Yes   [] No ? As child  Food Allergy:   [x] Yes   [] No  Leg ulcers:   [] Yes   [] No  Hand eczema:   [] Yes   [] No   Leading hand:   [] R   [] L       [] Ambidextrous                        Reason for tests (suspected allergy): recurrent dermatitis on extremities DDx atopic dermatitis and/or allergic contact dermatitis  Known previous allergies: none    Standardized panels  [x] Standard panel (40 tests)  [x] Preservatives & Antimicrobials (31 tests)  [x] Emulsifiers & Additives (25 tests)   [x] Perfumes/Flavours & Plants (25 tests) wife uses essential oil diffuser  [] Hairdresser panel (12 tests)  [x] Rubber Chemicals (22 tests)  [] Plastics (26 tests)  [] Colorants/Dyes/Food additives (20 tests)  [] Metals (implants/dental) (24 tests)  [] Local anaesthetics/NSAIDs (14 tests)  [] Antibiotics & Antimycotics (14 tests)   [x] Corticosteroids (15 tests)   [] Photopatch test (62 tests)   [] others: ...      [x] Patient's own products: Benadryl cream (make it worse), Calamine    lotion and wichhazel = to do on Friday    DO NOT test if chemical or biological identity is unknown!     always ask from patient the product information and safety sheets (MSDS)   [x] Atopy screen prick test (Atopic predisposition): if possible IDT molds and HDM    RESULTS &  EVALUATION of PATCH TESTS    Patch test readings after     [x] 2 days, [] 3 days [x] 4 days, [] 5 days,    Applied patch tests with results (import here the list of patch tests):    STANDARD Series      No Substance 2 days 4 days remarks   1 Neil Mix [C] - -    2 Colophony - -    3  2-Mercaptobenzothiazole  - -     4 Methylisothiazolinone - -    5 Carba Mix - -    6 Thiuram Mix [A] - -    7 Bisphenol A Epoxy Resin - -    8 I-Xfwy-Ghhovuujsaa-Formaldehyde Resin - -    9 Mercapto Mix [A] - -    10 Black Rubber Mix- PPD [B] - -    11 Potassium Dichromate  -  -    12 Balsam of [place] (Myroxylon Pereirae Resin) - -    13 Nickel Sulphate Hexahydrate - -    14 Mixed Dialkyl Thiourea - -    15 Paraben Mix [B] - -    16 Methyldibromo Glutaronitrile - -    17 Fragrance Mix - -    18 2-Bromo-2-Nitropropane-1,3-Diol (Bronopol) - -    19 Lyral - -    20 Tixocortol-21- Pivalate - -    21 Diazolidiyl Urea (Germall II) - -    22 Methyl Methacrylate - -    23 Cobalt (II) Chloride Hexahydrate - -    24 Fragrance Mix II  - -    25 Compositae Mix - -    26 Benzoyl Peroxide - -    27 Bacitracin - -    28 Formaldehyde - -    29 Methylchloroisothiazolinone / Methylisothiazolinone - -    30 Corticosteroid Mix - -    31 Sodium Lauryl Sulfate - -    32 Lanolin Alcohol - -    33 Turpentine - -    34 Cetylstearylalcohol - -    35 Chlorhexidine Dicluconate - -    36 Budenoside - -    37 Imidazolidinyl Urea  - -    38 Ethyl-2 Cyanoacrylate - -    39 Quaternium 15 (Dowicil 200) - -    40 Decyl Glucoside - -      CORTICOSTEROIDS   No Substance 2 days 4 days remarks Allergy  class   41 1 Amcinonide - -  B   42 2 Betametasone-17,21 Dipropionate - -  D1   43 3 Desoximetasone - -  C   44 4 Betamethasone-17-Valerate - -  D1   45 5 Dexamethasone - -  C   46 6 Hydrocortisone - -  A   47 7 Clobetasol-17-Propionate - -  D1   48 8 Dexamethasone-21-Phosphate Disodium Salt - -  C   49 9 Hydrocortisone-17 Butyrate - -  D2   50 10 Prednisolone - -  A   51 11  Mometason Furoate - -  D1   52 12 Triamcinolone Acetonide - -  B   53 13 Methylprednisolone Aceponate - -  D2   54 14 Hydrocortisone-21-Acetate - -  A   55 15 Prednicarbate - -  D2     Group Characteristics of group Generic name Name  cross reactions   A Hydrocortisone   Cloprednole, Fludrocortisone acétate, Hydrocortison acetate, Methylprednisolone, Prednisolone, Tixocortolpivalate Alfacortone, Fucidin H, Dermacalm, Hexacortone, Premandole, Imacort With group D2   B Triamcinolone-acetonide   Budenoside (R-isomer), Amcinonide, Desonide, Fluocinolone acetonide, Triamcinolone acetonide Locapred, Locatop  Synalar, Pevisone, Kenacort -   C Betamethasone (Without Lashon)   Betamethasone, Dexamethasone, Flumethasone pivalate, Halomethasone Daivobet, Dexasalyl, Locasalen,   -   D1 Betamethasone-diproprionate   Betamethasone dipropionate, Betamethasone-17-valerate, Clobetasole-propionate, Fluticasone propionate, Mometasone furoate Betnovate, Diprogenta, Diprosalic, Diprosone, Celestoderm, Fucicort,  Cutivate, Axotide, Elocom -   D2 Methylprednisolone-aceponate   Hydrocortisone-aceponate, Hydrocortisone-buteprate, Hydrocortisone-17-butyrate, Methylprednisolone aceponate, Prednicarbate Locoïd, Advantan,  Prednitop With group A and Budesonide (S-isomer)     EMULSIFIERS & ADDITIVES   No Substance 2 days 4 days remarks   56 1 Polyethylene Glycol-400 - -    57 2 Cocamidopropyl Betaine - -    58 3 Amerchol L101 - -    59 4 Propylene Glycol - -    60 5 Triethanolamine - -    61 6 Sorbitane Sesquiolate - -    62 7 Isopropylmyristate - -    63 8 Polysorbate 80  - -    64 9 Amidoamine   (Stearamidopropyl Dimethylamine) - -    65 10 Oleamidopropyl Dimethylamine - -    66 11 Lauryl Glucoside - -    67 12 Coconut Diethanolamide  - -    68 13 2-Hydroxy-4-Methoxy Benzophenone (Oxybenzone) - -    69 14 Benzophenone-4 (Sulisobenzon) - -    70 15 Propolis - -    71 16 Dexpanthenol - -    72 17 Carboxymethyl Cellulose Sodium - -    73 18 Abitol  - -    74 19 Tert-Butylhydroquinone - -    75 20 Benzyl Salicylate - -      Antioxidant      76 21 Dodecyl Gallate - -    77 22 Butylhydroxyanisole (BHA) - -    78 23 Butylhydroxytoluene (BHT) - -    79 24 Di-Alpha-Tocopherol (Vit E) - -    80 25 Propyl Gallate - -    81 26 Zinc Pyrithione      82 27 Dimethylaminopropylamin (DMAPA)        PRESERVATIVES & ANTIMICROBIALS     No Substance 2 days 4 days remarks   83 1  1,2-Benzisothiazoline-3-One, Sodium Salt - -    84 2  1,3,5-Ross (2-Hydroxyethyl) - Hexahydrotriazine (Grotan BK) - -    85 3 8-Zkeysuwvtmdwt-9-Nitro-1, 3-Propanediol - -    86 4  3, 4, 4' - Triclocarban - -    87 5 4 - Chloro - 3 - Cresol - -    88 6 4 - Chloro - 4 - Xylenol (PCMX) - -    89 7 7-Ethylbicyclooxazolidine (Bioban BF5778) - -    90 8 Benzalkonium Chloride - -    91 9 Benzyl Alcohol - -    92 10 Cetalkonium Chloride - -    93 11 Cetylpyrimidine Chloride  - -    94 12 Chloroacetamide - -    95 13 DMDM Hydantoin - -    96 14 Glutaraldehyde - -    97 15 Triclosan - -    98 16 Glyoxal Trimeric Dihydrate - -    99 17 Iodopropynyl Butylcarbamate - -    100 18 Octylisothiazoline - -    101 19 Iodoform - -    102 20 (Nitrobutyl) Morpholine/(Ethylnitro-Trimethylene) Dimorpholine (Bioban P 1487) - -    103 21 Phenoxyethanol - -    104 22 Phenyl Salicylate - -    105 23 Povidone Iodine - -    106 24 Sodium Benzoate - -    107 25 Sodium Disulfite - -    108 26 Sorbic Acid - -    109 27 Thimerosal - -    110 28 Melamine Formeladyde Resin      111 29 Ethylenediamine Dihydrochloride        Parabens      112 30 Butyl-P-Hydroxybenzoate - -    113 31 Ethyl-P-Hydroxybenzoate - -    114 32 Methyl-P-Hydroxybenzoate - -    115 33 Propyl-P-Hydroxybenzoate - -      PERFUMES, FLAVORS & PLANTS      No Substance 2 days 4 days remarks   116 1 Benzyl Cinnamate - -    117 2 Di-Limonene (Dipentene) - -    118 3 Cananga Odorata (Jacqueline Phillips) (I) - -    119 4 Lichen Acid Mix - -    120 5 Mentha Piperita Oil (Peppermint Oil)  - -    121 6 Sesquiterpenelactone mix - -    122 7 Tea Tree Oil, Oxidized - -    123 8 Wood Tar Mix (+) -    124 9 Abietic Acid - -    125 10 Lavendula Angustifolia Oil (Lavender Oil) - -    126 11 Camphor  - -      Fragrance Mix I      127 12 Oakmoss Absolute - -    128 13 Eugenol - -    129 14 Geraniol - -    130 15 Hydroxycitronellal - -    131 16 Isoeugenol - -    132 17 Cinnamic Aldehyde - -    133 18 Cinnamic Alcohol  - -      Fragrance mix II      134 19 Citronellol - -    135 20 Alpha-Hexylcinnamic Aldehyde    - -    136 21 Citral - -    137 22 Farnesol - -    18 23 Coumarin - -      RUBBER CHEMICALS     No Substance 2 days 4 days remarks   139  Carbamate      140 1 Zinc Bis ( Diethyldithio carbamate ) (ZDEC) - -    141 2 Zinc Bis (Dibutyldithiocarbamate) - -    142 3 1,3-Diphenylguanidine (DPG) - -      Thiourea      143 4 Dibutylthiourea - -    144 5 Diphenyltiourea - -    145 6 Thiourea - -      Mercapto chemicals      146 7 Morpholinyl Mercaptobenzothiazole - -    147 8 T-Xvezzxbruz-4-Benzothiazyl-Sulfenamide - -    148 9 Dibenzothiazyl Disulfide - -      Thiuram chemicals      149 10 Dipentamethylenethiuram Disulfide - -    150 11 Tetraethylthiuram Disulfide (Disulfiram) - -    151 12 Tetramethylthiuram Disulfide - -    152 13 Tetramethyl Thiuram Monosulfide (TMTM) - -      4-Phenylenediamine derivatives      153 14 N-Isopropyl-N'-Phenyl-P-Phenylenediamine (IPPD) - -    154 15 Ualtjony-W-Wkfihlhtmvgeoxxx (DPPD) - -      Various Rubber Additives      155 16 Hydroquinone Monobenzylether  - -    156 17 Hexamethylenetetramine - -    157 18 4,4'-Dihydroxybiphenyl - -    158 19 Cyclohexylthiophtalimide - -    159 20 N-Phenyl-B-Naphthylamine - -    160 21 Dodecyl Mercaptan - -      OTHER PRODUCTS (put on 1/15/2021)      No Substance Conc  % solv 2 days 4 days remarks   161 1 Benadryl Cream   - -    162 2 Calamine   - -    163 3 Whichhazel   - -    164 4         165 5         166 6         167 7         168 8          169 9         170 10           Results of patch tests:                         Interpretation:    - Negative                    A    = Allergic      (+) Erythema    TI   = Toxic/irritant   + E + Infiltration    RaP = Relevance at Present     ++ E/I + Papulovesicle   Rpr  = Relevance Previously     +++ E/I/P + Blister     nR   = No Relevance      Atopy Screen (Placed 11/03/20 )    No Substance Readings (15 min) Evaluation   POS Histamine 1mg/ml ++    NEG NaCl 0.9% -      No Substance Readings (15 min) Evaluation   1 Alternaria alternata (tenuis)  ++    2 Cladosporium herbarum -    3 Aspergillus fumigatus -    4 Penicillium notatum -    5 Dermatophagoides pteronyssinus -    6 Dermatophagoides farinae -    7 Dog epithelium (canis spp) -    8 Cat hair (marco catus) -    9 Cockroach   (Blatella americana & germanica) -    10 Grass mix midwest   (Kellee, Orchard, Redtop, Francisco Javier) -    11 Jj grass (sorghum halepense) -    12 Weed mix   (common Cocklebur, Lamb s quarters, rough redroot Pigweed, Dock/Sorrel) +    13 Mug wort (artemisia vulgare) +    14 Ragweed giant/short (ambrosia spp) -    15 English Plantain (plantago lanceolata) -    16 Tree mix 1 (Pecan, Maple BHR, Oak RVW, american Kittrell, black Alton) +    17 Red cedar (juniperus virginia) +    18 Tree mix 2   (white Frederic, river/red Birch, black Turner, common Fowler, american Elm) -    19 Box elder/Maple mix (acer spp) -    20 [place] shagbark (carya ovata) -       -      Conclusion: patient is atopic with sensitization to Alternaria mold and some pollens (weed/tree)      Intradermal Testing (Placed 11/03/20 )    No Substance Conc.  Readings (15min) Evaluation   - NaCl  0.9% neg    + Histamine (prick) 0.1mg / ml -    DF Standard Dust Mite - D. Farinae 1:10 neg    DP Standard Dust Mite - D. Pteronyssinus 1:10 neg    A Aspergillus fumigatus  1:10 + 7mmP/E   P Penicillium notatum 1:10 neg 5mmP, no E     Conclusion: slight reaction in intradermal test to  Aspergillus, but not to dust mites after 15min. After 1 day all reactions gone    [x] No relevant allergic reaction observed      Interpretation/ remarks:   In the patch tests no signs for relevant contact sensitizitions. But in pictures clear localized eczematous flare-up and in prick tests positive to the mold alternaria, which means an atopy.  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   * acute illness with systemic symptoms    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 3.19 and Jan 2021 0.89) with total IgE not elevated)    Maybe combination of red meat and NSAIDs (Ibuprofen can lead to the angioedemas)    Emergency set: Prednisone 100mg and 360mg Fexofenadine  --> avoid eating red meat and particularly taking NSAIDs at the same time    >> Patient had anaphylactic reaction with generalized Urticaria and breathing problems to bee and wasp    Patient has specific IgE to wasp (0.99), but NOT to bee (neg)    These conclusions are made at the best of one's knowledge and belief based on the provided evidence such as patient's history and allergy test results and they can change over time or can be incomplete because of missing information's.    ==> Treatment prescribed/Plan:    >> take care of the atopic skin:    continue hydration of skin with Vanicream cream 2xdaily regularly    Free&Clear Shampoo and  soap    For 10 days oral Prednisone 50mg starting and reduction by 5mg daily    if new lesions use right away for 2-4 days daily Mometasone ointment and for scalp Mometasone soln    Continue with Fexofenadine 2xdaily    Avoid wool directly on the skin     Protopic ointment 0.1% was too expensive!    --> if eczema gets more active again and topical treatments are not sufficient, then we can consider use of Dupixent (Dupilumab) injections      Staff: : provider      Follow-up in Derm-Allergy clinic in person in about 5 weeks and then decide how to pursue  ___________________________      I spent a total of 31 minutes with Jaspreet Ridley during today s  visit. This time was spent discussing all the individual test results, correlating them to the clinical relevance, counseling the patient and/or coordinating care

## 2022-01-03 ENCOUNTER — VIRTUAL VISIT (OUTPATIENT)
Dept: ALLERGY | Facility: CLINIC | Age: 67
End: 2022-01-03
Payer: COMMERCIAL

## 2022-01-03 DIAGNOSIS — L23.89 ALLERGIC CONTACT DERMATITIS DUE TO OTHER AGENTS: ICD-10-CM

## 2022-01-03 DIAGNOSIS — L20.89 OTHER ATOPIC DERMATITIS: ICD-10-CM

## 2022-01-03 DIAGNOSIS — L29.9 PRURITIC DISORDER: Primary | ICD-10-CM

## 2022-01-03 PROCEDURE — 99214 OFFICE O/P EST MOD 30 MIN: CPT | Mod: 95 | Performed by: DERMATOLOGY

## 2022-01-03 RX ORDER — MOMETASONE FUROATE 1 MG/G
OINTMENT TOPICAL DAILY
Qty: 45 G | Refills: 3 | Status: SHIPPED | OUTPATIENT
Start: 2022-01-03

## 2022-01-03 RX ORDER — FEXOFENADINE HCL 180 MG/1
180 TABLET ORAL 2 TIMES DAILY PRN
Qty: 50 TABLET | Refills: 3 | Status: SHIPPED | OUTPATIENT
Start: 2022-01-03

## 2022-01-03 RX ORDER — MOMETASONE FUROATE 1 MG/ML
SOLUTION TOPICAL
Qty: 60 ML | Refills: 1 | Status: SHIPPED | OUTPATIENT
Start: 2022-01-04

## 2022-01-03 RX ORDER — PREDNISONE 10 MG/1
TABLET ORAL
Qty: 29 TABLET | Refills: 0 | Status: SHIPPED | OUTPATIENT
Start: 2022-01-03 | End: 2022-01-15

## 2022-01-03 NOTE — NURSING NOTE
Dermatology Rooming Note    Jaspreet Rdiley's goals for this visit include:   Chief Complaint   Patient presents with     MICHELLEECK     Jaspreet is being seen today for a eczema follow up and discuss medication - having bad outbreaks      CAREY Braswell

## 2022-01-03 NOTE — LETTER
1/3/2022         RE: Jaspreet Ridley  [id number] [place] [address]        Dear Colleague,    Thank you for referring your patient, Jaspreet Ridley, to the [facility] [place]. Please see a copy of my visit note below.    [facility] Dermato-allergology Note  Virtual visit: store and forward phone call ([employer] did not work), start time: 4:41pm, end time: 5:10pm, date of images: na  Encounter Date: Andrew 3, 2022  ____________________________________________    CC: No chief complaint on file.      HPI:  (01/02/22)  Mr. 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 Since then broke out on chest  - since 15years however, no headaches anymore.  - patient is now eating meat without problems  - Medications: Trazadone every evening entire year, takes occasionally an Ibuprofen (last one 3 months ago)    Physical exam:  General: In no acute distress, well-developed, well-nourished  Eyes: no conjunctivitis  ENT: no signs of rhinitis   Pulmonary: no wheezing or coughing  Skin:no active lesions in the moment    Earlier History and Allergy exams:  01/15/2021  Patient here for 2nd readings and final conclusions after 4 days (in person)    Patient had initially Doxepin and Allegra --> worked well  As soon it runs out the itching comes back. However, still developed eczematous  lesions on trunk      Earlier History and Allergy exams:  Patient stopped using many different products and stopped using perfumes. Had in December on upper right arm for few days acute eczematous lesions and similar lesions on the chest area and disappeared after 3-4 days using topical corticosteroids    Patient previously seen for itching and rash. Was to follow up in August 2020 for Prick/Patch testing, but his rash had resolved and so he cancelled the testing. He says that now it has returned again within the last 10 days. He is here today to try and figure out what is going on.     Eats red meat (not major quantities) and no problem    Had a flare up on 06/26 a flare up of a dermatitis on the right upper and lower arm and the lesions seem to appear always on same location and seem to stay longer than 24h and skin stays dry (now today still slight erythema and dryness)    Monday morning after mothers day patient had a reaction (woke up). Went to sleep on Sunday evening (went to sleep around 11pm and woke up about 2h later with sweating and had chest and neck again burning and itching sensations and next morning some on the arm pits.   Had this Sunday 2 eggs for breakfast and bread for brunch and dinner around 7/8pm and ate Chicken Fajitas. No alcohol. No additional pain medications except daily Oxycodon for chronic neck pain.   --> patient did not eat red meat on this day  --> however, patient did not do the treatment with Doxepin or Singulair and took only Allegra in the morning (Compliance?).    Patient has almost every 2nd evening these sweatings and itching attacks and therefore not better (however, compliance not very good)    Previous History of Present Illness:  04/07/2020 virtual consultation:  Middle December 2019 first on head itching and then over the strap of the CEPAP sleep apnoe mask and then over neck and later over the shoulders. First week of February in went to Dermatology --> diagnosed  Folliculitis and got antibiotic and topicals. Since then got urticarial lesions all over the upper body.   Patient travelled much for work and beginning of February went to [place].   Patient had a more severe reaction with lip swelling and breathing problems on 4th February woke up with Urticaria and around 10:30 patient was short of breath (not really angioedema).  Flew then the same evening from [place] to [place]. On the morning of the 5th of February bottom lip all puffy and swollen, not really short of breath. In ER with antihistamines itch went away and steroid injection.  After 6-7 days Urticaria came back and then went back to [facility]. They took there a biopsy and there again steroid injection.     Usually the lesions appear after sweating and they can itch or burn. After 3-4hours the lesions are still there.      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   - history of hay fever and asthma as a child    Current medications:   Tizanidine muscle relaxants since 7 years (Zanaflex) --> stopped 3-4 days and no changes  Oxycodone every day  Metaxolone muscle relaxants  Montelukast from Dr. Yi  Famotidine from Dr. Yi  Vitamine D  Cetirizine 10mg 2xdaily  Diphenhydramine (Benadryl)    No blood pressure medications, no diabetes  Tries to avoid NSAIDs (sometimes Ibuprofen 800mg --> 1-2 times per  month)      Allergy History:     Allergies   Allergen Reactions     Alpha-Gal Fatigue, Headache, Hives, Rash and Shortness Of Breath     Bee Pollen Anaphylaxis     Iodine Anaphylaxis     Contrast Dye      Fentanyl Hives     Iodine-131      --> as child pollen allergies. Still today in fall with ragweed rhinoconjunctivitis  --> as a child Asthma and had Immunotherapy    Social History:  The patient works as a . Patient has the following hobbies or non-occupational exposure     reports that he has quit smoking. He does not have any smokeless tobacco history on file.      Family History:  No family history on file.    Medications:  Current Outpatient Medications   Medication Sig Dispense Refill     cetirizine (ZYRTEC) 10 MG tablet Take 40-50 mg by mouth       diphenhydrAMINE (BENADRYL) 25 MG tablet Take 25-50 mg by mouth       doxepin (SINEQUAN) 25 MG capsule Take 1 capsule (25 mg) by mouth At Bedtime 50 capsule 3     doxepin (SINEQUAN) 25 MG capsule Take 1 capsule (25 mg) by mouth At Bedtime 60 capsule 0     emollient (VANICREAM) external cream Apply topically 2 times daily 453 g 11     emollient (VANICREAM) external cream Apply topically 2 times daily Free &Clear soap and shampoo 453 g 4     fexofenadine (ALLEGRA) 180 MG tablet Take 1 tablet (180 mg) by mouth daily 50 tablet 3     metaxalone (SKELAXIN) 800 MG tablet 800 mg       Misc. Devices (CERVICAL TRACTION) KIT Over the Door Cervical Traction.  May be obtained through [employer] or ordered from the internet.  Traction weight from 8 to 15 pounds.  Not to exceed more than 15 pounds of traction weight.  Begin 10 minutes of traction daily and increase to 15 minutes, two to three times every day. 1 kit 0     mometasone (ELOCON) 0.1 % external ointment Apply topically daily For 3-4 days on acute eczematous lesions 45 g 4     mometasone (ELOCON) 0.1 % external ointment On Saturday and Sunday on lesions 45 g 3     mometasone (ELOCON)  0.1 % external solution Apply topically daily 2-3 times weekly on itchy lesions in scalp and beard reagion 60 mL 1     montelukast (SINGULAIR) 10 MG tablet Take 10 mg by mouth every evening       oxyCODONE IR (ROXICODONE) 10 MG tablet Take 10 mg by mouth daily       predniSONE (DELTASONE) 50 MG tablet Emergency set: 2 tablet 3     tiZANidine (ZANAFLEX) 4 MG tablet Take 4 mg by mouth At Bedtime       vitamin D2 (ERGOCALCIFEROL) 88291 units (1250 mcg) capsule Take 50,000 Units by mouth         Allergy Tests:    Past Allergy Test: in the 60s = ragweed, dust mites, feathers    Future Allergy Test:   >> plan in future atopy screen prick test and maybe oral provocation test with red meat    Order for PATCH TESTS    [x] Outpatient  [] Inpatient: [facility]..../ Bed ....      Skin Atopy (atopic dermatitis) [x] Yes   [] No since Dec 2019  Rhinitis/Sinusitis:   [x] Yes   [] No as child with positive skin tests and IT as child  Allergic Asthma:   [x] Yes   [] No ? As child  Food Allergy:   [x] Yes   [] No  Leg ulcers:   [] Yes   [] No  Hand eczema:   [] Yes   [] No   Leading hand:   [] R   [] L       [] Ambidextrous                        Reason for tests (suspected allergy): recurrent dermatitis on extremities DDx atopic dermatitis and/or allergic contact dermatitis  Known previous allergies: none    Standardized panels  [x] Standard panel (40 tests)  [x] Preservatives & Antimicrobials (31 tests)  [x] Emulsifiers & Additives (25 tests)   [x] Perfumes/Flavours & Plants (25 tests) wife uses essential oil diffuser  [] Hairdresser panel (12 tests)  [x] Rubber Chemicals (22 tests)  [] Plastics (26 tests)  [] Colorants/Dyes/Food additives (20 tests)  [] Metals (implants/dental) (24 tests)  [] Local anaesthetics/NSAIDs (14 tests)  [] Antibiotics & Antimycotics (14 tests)   [x] Corticosteroids (15 tests)   [] Photopatch test (62 tests)   [] others: ...      [x] Patient's own products: Benadryl cream (make it worse), Calamine    lotion and  marcos = to do on Friday    DO NOT test if chemical or biological identity is unknown!     always ask from patient the product information and safety sheets (MSDS)   [x] Atopy screen prick test (Atopic predisposition): if possible IDT molds and HDM    RESULTS & EVALUATION of PATCH TESTS    Patch test readings after     [x] 2 days, [] 3 days [x] 4 days, [] 5 days,    Applied patch tests with results (import here the list of patch tests):    STANDARD Series      No Substance 2 days 4 days remarks   1 Niel Mix [C] - -    2 Colophony - -    3  2-Mercaptobenzothiazole  - -     4 Methylisothiazolinone - -    5 Carba Mix - -    6 Thiuram Mix [A] - -    7 Bisphenol A Epoxy Resin - -    8 M-Twvz-Moojeodfvgp-Formaldehyde Resin - -    9 Mercapto Mix [A] - -    10 Black Rubber Mix- PPD [B] - -    11 Potassium Dichromate  -  -    12 Balsam of [place] (Myroxylon Pereirae Resin) - -    13 Nickel Sulphate Hexahydrate - -    14 Mixed Dialkyl Thiourea - -    15 Paraben Mix [B] - -    16 Methyldibromo Glutaronitrile - -    17 Fragrance Mix - -    18 2-Bromo-2-Nitropropane-1,3-Diol (Bronopol) - -    19 Lyral - -    20 Tixocortol-21- Pivalate - -    21 Diazolidiyl Urea (Germall II) - -    22 Methyl Methacrylate - -    23 Cobalt (II) Chloride Hexahydrate - -    24 Fragrance Mix II  - -    25 Compositae Mix - -    26 Benzoyl Peroxide - -    27 Bacitracin - -    28 Formaldehyde - -    29 Methylchloroisothiazolinone / Methylisothiazolinone - -    30 Corticosteroid Mix - -    31 Sodium Lauryl Sulfate - -    32 Lanolin Alcohol - -    33 Turpentine - -    34 Cetylstearylalcohol - -    35 Chlorhexidine Dicluconate - -    36 Budenoside - -    37 Imidazolidinyl Urea  - -    38 Ethyl-2 Cyanoacrylate - -    39 Quaternium 15 (Dowicil 200) - -    40 Decyl Glucoside - -      CORTICOSTEROIDS   No Substance 2 days 4 days remarks Allergy  class   41 1 Amcinonide - -  B   42 2 Betametasone-17,21 Dipropionate - -  D1   43 3 Desoximetasone - -  C   44 4  Betamethasone-17-Valerate - -  D1   45 5 Dexamethasone - -  C   46 6 Hydrocortisone - -  A   47 7 Clobetasol-17-Propionate - -  D1   48 8 Dexamethasone-21-Phosphate Disodium Salt - -  C   49 9 Hydrocortisone-17 Butyrate - -  D2   50 10 Prednisolone - -  A   51 11 Mometason Furoate - -  D1   52 12 Triamcinolone Acetonide - -  B   53 13 Methylprednisolone Aceponate - -  D2   54 14 Hydrocortisone-21-Acetate - -  A   55 15 Prednicarbate - -  D2     Group Characteristics of group Generic name Name  cross reactions   A Hydrocortisone   Cloprednole, Fludrocortisone acétate, Hydrocortison acetate, Methylprednisolone, Prednisolone, Tixocortolpivalate Alfacortone, Fucidin H, Dermacalm, Hexacortone, Premandole, Imacort With group D2   B Triamcinolone-acetonide   Budenoside (R-isomer), Amcinonide, Desonide, Fluocinolone acetonide, Triamcinolone acetonide Locapred, Locatop  Synalar, Pevisone, Kenacort -   C Betamethasone (Without Lashon)   Betamethasone, Dexamethasone, Flumethasone pivalate, Halomethasone Daivobet, Dexasalyl, Locasalen,   -   D1 Betamethasone-diproprionate   Betamethasone dipropionate, Betamethasone-17-valerate, Clobetasole-propionate, Fluticasone propionate, Mometasone furoate Betnovate, Diprogenta, Diprosalic, Diprosone, Celestoderm, Fucicort,  Cutivate, Axotide, Elocom -   D2 Methylprednisolone-aceponate   Hydrocortisone-aceponate, Hydrocortisone-buteprate, Hydrocortisone-17-butyrate, Methylprednisolone aceponate, Prednicarbate Locoïd, Advantan,  Prednitop With group A and Budesonide (S-isomer)     EMULSIFIERS & ADDITIVES   No Substance 2 days 4 days remarks   56 1 Polyethylene Glycol-400 - -    57 2 Cocamidopropyl Betaine - -    58 3 Amerchol L101 - -    59 4 Propylene Glycol - -    60 5 Triethanolamine - -    61 6 Sorbitane Sesquiolate - -    62 7 Isopropylmyristate - -    63 8 Polysorbate 80  - -    64 9 Amidoamine   (Stearamidopropyl Dimethylamine) - -    65 10 Oleamidopropyl Dimethylamine - -    66 11  Lauryl Glucoside - -    67 12 Coconut Diethanolamide  - -    68 13 2-Hydroxy-4-Methoxy Benzophenone (Oxybenzone) - -    69 14 Benzophenone-4 (Sulisobenzon) - -    70 15 Propolis - -    71 16 Dexpanthenol - -    72 17 Carboxymethyl Cellulose Sodium - -    73 18 Abitol - -    74 19 Tert-Butylhydroquinone - -    75 20 Benzyl Salicylate - -      Antioxidant      76 21 Dodecyl Gallate - -    77 22 Butylhydroxyanisole (BHA) - -    78 23 Butylhydroxytoluene (BHT) - -    79 24 Di-Alpha-Tocopherol (Vit E) - -    80 25 Propyl Gallate - -    81 26 Zinc Pyrithione      82 27 Dimethylaminopropylamin (DMAPA)        PRESERVATIVES & ANTIMICROBIALS     No Substance 2 days 4 days remarks   83 1  1,2-Benzisothiazoline-3-One, Sodium Salt - -    84 2  1,3,5-Ross (2-Hydroxyethyl) - Hexahydrotriazine (Grotan BK) - -    85 3 3-Ofiqpffrcwtci-9-Nitro-1, 3-Propanediol - -    86 4  3, 4, 4' - Triclocarban - -    87 5 4 - Chloro - 3 - Cresol - -    88 6 4 - Chloro - 4 - Xylenol (PCMX) - -    89 7 7-Ethylbicyclooxazolidine (Bioban [id number]) - -    90 8 Benzalkonium Chloride - -    91 9 Benzyl Alcohol - -    92 10 Cetalkonium Chloride - -    93 11 Cetylpyrimidine Chloride  - -    94 12 Chloroacetamide - -    95 13 DMDM Hydantoin - -    96 14 Glutaraldehyde - -    97 15 Triclosan - -    98 16 Glyoxal Trimeric Dihydrate - -    99 17 Iodopropynyl Butylcarbamate - -    100 18 Octylisothiazoline - -    101 19 Iodoform - -    102 20 (Nitrobutyl) Morpholine/(Ethylnitro-Trimethylene) Dimorpholine (Bioban P 1487) - -    103 21 Phenoxyethanol - -    104 22 Phenyl Salicylate - -    105 23 Povidone Iodine - -    106 24 Sodium Benzoate - -    107 25 Sodium Disulfite - -    108 26 Sorbic Acid - -    109 27 Thimerosal - -    110 28 Melamine Formeladyde Resin      111 29 Ethylenediamine Dihydrochloride        Parabens      112 30 Butyl-P-Hydroxybenzoate - -    113 31 Ethyl-P-Hydroxybenzoate - -    114 32 Methyl-P-Hydroxybenzoate - -    115 33  Propyl-P-Hydroxybenzoate - -      PERFUMES, FLAVORS & PLANTS      No Substance 2 days 4 days remarks   116 1 Benzyl Cinnamate - -    117 2 Di-Limonene (Dipentene) - -    118 3 Cananga Odorata (Jacqueline Phillips) (I) - -    119 4 Lichen Acid Mix - -    120 5 Mentha Piperita Oil (Peppermint Oil) - -    121 6 Sesquiterpenelactone mix - -    122 7 Tea Tree Oil, Oxidized - -    123 8 Wood Tar Mix (+) -    124 9 Abietic Acid - -    125 10 Lavendula Angustifolia Oil (Lavender Oil) - -    126 11 Camphor  - -      Fragrance Mix I      127 12 Oakmoss Absolute - -    128 13 Eugenol - -    129 14 Geraniol - -    130 15 Hydroxycitronellal - -    131 16 Isoeugenol - -    132 17 Cinnamic Aldehyde - -    133 18 Cinnamic Alcohol  - -      Fragrance mix II      134 19 Citronellol - -    135 20 Alpha-Hexylcinnamic Aldehyde    - -    136 21 Citral - -    137 22 Farnesol - -    18 23 Coumarin - -      RUBBER CHEMICALS     No Substance 2 days 4 days remarks   139  Carbamate      140 1 Zinc Bis ( Diethyldithio carbamate ) (ZDEC) - -    141 2 Zinc Bis (Dibutyldithiocarbamate) - -    142 3 1,3-Diphenylguanidine (DPG) - -      Thiourea      143 4 Dibutylthiourea - -    144 5 Diphenyltiourea - -    145 6 Thiourea - -      Mercapto chemicals      146 7 Morpholinyl Mercaptobenzothiazole - -    147 8 Y-Vgqpateuoi-0-Benzothiazyl-Sulfenamide - -    148 9 Dibenzothiazyl Disulfide - -      Thiuram chemicals      149 10 Dipentamethylenethiuram Disulfide - -    150 11 Tetraethylthiuram Disulfide (Disulfiram) - -    151 12 Tetramethylthiuram Disulfide - -    152 13 Tetramethyl Thiuram Monosulfide (TMTM) - -      4-Phenylenediamine derivatives      153 14 N-Isopropyl-N'-Phenyl-P-Phenylenediamine (IPPD) - -    154 15 Xiriprom-N-Bcoymcxtyopuowdp (DPPD) - -      Various Rubber Additives      155 16 Hydroquinone Monobenzylether  - -    156 17 Hexamethylenetetramine - -    157 18 4,4'-Dihydroxybiphenyl - -    158 19 Cyclohexylthiophtalimide - -    159 20  N-Phenyl-B-Naphthylamine - -    160 21 Dodecyl Mercaptan - -      OTHER PRODUCTS (put on 1/15/2021)      No Substance Conc  % solv 2 days 4 days remarks   161 1 Benadryl Cream   - -    162 2 Calamine   - -    163 3 Whichhazel   - -    164 4         165 5         166 6         167 7         168 8         169 9         170 10           Results of patch tests:                         Interpretation:    - Negative                    A    = Allergic      (+) Erythema    TI   = Toxic/irritant   + E + Infiltration    RaP = Relevance at Present     ++ E/I + Papulovesicle   Rpr  = Relevance Previously     +++ E/I/P + Blister     nR   = No Relevance      Atopy Screen (Placed 11/03/20 )    No Substance Readings (15 min) Evaluation   POS Histamine 1mg/ml ++    NEG NaCl 0.9% -      No Substance Readings (15 min) Evaluation   1 Alternaria alternata (tenuis)  ++    2 Cladosporium herbarum -    3 Aspergillus fumigatus -    4 Penicillium notatum -    5 Dermatophagoides pteronyssinus -    6 Dermatophagoides farinae -    7 Dog epithelium (canis spp) -    8 Cat hair (marco catus) -    9 Cockroach   (Blatella americana & germanica) -    10 Grass mix midwest   (Kellee, Orchard, Redtop, Francisco Javier) -    11 Jj grass (sorghum halepense) -    12 Weed mix   (common Cocklebur, Lamb s quarters, rough redroot Pigweed, Dock/Sorrel) +    13 Mug wort (artemisia vulgare) +    14 Ragweed giant/short (ambrosia spp) -    15 English Plantain (plantago lanceolata) -    16 Tree mix 1 (Pecan, Maple BHR, Oak RVW, american Rush, black [place]) +    17 Red cedar (juniperus virginia) +    18 Tree mix 2   (white Frederic, river/red Birch, black [place], common [place], american Elm) -    19 Box elder/Maple mix (acer spp) -    20 Linwood shagbark (carya ovata) -       -      Conclusion: patient is atopic with sensitization to Alternaria mold and some pollens (weed/tree)      Intradermal Testing (Placed 11/03/20 )    No Substance Conc.  Readings (15min)  Evaluation   - NaCl  0.9% neg    + Histamine (prick) 0.1mg / ml -    DF Standard Dust Mite - D. Farinae 1:10 neg    DP Standard Dust Mite - D. Pteronyssinus 1:10 neg    A Aspergillus fumigatus  1:10 + 7mmP/E   P Penicillium notatum 1:10 neg 5mmP, no E     Conclusion: slight reaction in intradermal test to Aspergillus, but not to dust mites after 15min. After 1 day all reactions gone    [x] No relevant allergic reaction observed      Interpretation/ remarks:   In the patch tests no signs for relevant contact sensitizitions. But in pictures clear localized eczematous flare-up and in prick tests positive to the mold alternaria, which means an atopy.  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   * acute illness with systemic symptoms    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 3.19 and Jan 2021 0.89) with total IgE not elevated)    Maybe combination of red meat and NSAIDs (Ibuprofen can lead to the angioedemas)    Emergency set: Prednisone 100mg and 360mg Fexofenadine  --> avoid eating red meat and particularly taking NSAIDs at the same time    >> Patient had anaphylactic reaction with generalized Urticaria and breathing problems to bee and wasp    Patient has specific IgE to wasp (0.99), but NOT to bee (neg)    These conclusions are made at the best of one's knowledge and belief based on the provided evidence such  as patient's history and allergy test results and they can change over time or can be incomplete because of missing information's.    ==> Treatment prescribed/Plan:    >> take care of the atopic skin:    continue hydration of skin with Vanicream cream 2xdaily regularly    Free&Clear Shampoo and soap    For 10 days oral Prednisone 50mg starting and reduction by 5mg daily    if new lesions use right away for 2-4 days daily Mometasone ointment and for scalp Mometasone soln    Continue with Fexofenadine 2xdaily    Avoid wool directly on the skin     Protopic ointment 0.1% was too expensive!    --> if eczema gets more active again and topical treatments are not sufficient, then we can consider use of Dupixent (Dupilumab) injections      Staff: : provider      Follow-up in Derm-Allergy clinic in person in about 5 weeks and then decide how to pursue  ___________________________      I spent a total of 31 minutes with Jaspreet Ridley during today s  visit. This time was spent discussing all the individual test results, correlating them to the clinical relevance, counseling the patient and/or coordinating care              Again, thank you for allowing me to participate in the care of your patient.        Sincerely,        Jonas Garza MD

## 2022-02-12 ENCOUNTER — HEALTH MAINTENANCE LETTER (OUTPATIENT)
Age: 67
End: 2022-02-12

## 2022-04-09 ENCOUNTER — HEALTH MAINTENANCE LETTER (OUTPATIENT)
Age: 67
End: 2022-04-09

## 2022-07-30 ENCOUNTER — HEALTH MAINTENANCE LETTER (OUTPATIENT)
Age: 67
End: 2022-07-30

## 2022-10-09 ENCOUNTER — HEALTH MAINTENANCE LETTER (OUTPATIENT)
Age: 67
End: 2022-10-09

## 2022-11-26 ENCOUNTER — HEALTH MAINTENANCE LETTER (OUTPATIENT)
Age: 67
End: 2022-11-26

## 2023-03-25 ENCOUNTER — HEALTH MAINTENANCE LETTER (OUTPATIENT)
Age: 68
End: 2023-03-25

## 2024-01-06 ENCOUNTER — HEALTH MAINTENANCE LETTER (OUTPATIENT)
Age: 69
End: 2024-01-06